# Patient Record
Sex: FEMALE | Race: OTHER | Employment: PART TIME | URBAN - NONMETROPOLITAN AREA
[De-identification: names, ages, dates, MRNs, and addresses within clinical notes are randomized per-mention and may not be internally consistent; named-entity substitution may affect disease eponyms.]

---

## 2020-01-09 ENCOUNTER — OFFICE VISIT (OUTPATIENT)
Dept: FAMILY MEDICINE CLINIC | Age: 50
End: 2020-01-09
Payer: MEDICAID

## 2020-01-09 VITALS
DIASTOLIC BLOOD PRESSURE: 68 MMHG | OXYGEN SATURATION: 98 % | HEIGHT: 63 IN | WEIGHT: 205 LBS | HEART RATE: 89 BPM | SYSTOLIC BLOOD PRESSURE: 112 MMHG | BODY MASS INDEX: 36.32 KG/M2

## 2020-01-09 PROCEDURE — 99204 OFFICE O/P NEW MOD 45 MIN: CPT | Performed by: FAMILY MEDICINE

## 2020-01-09 PROCEDURE — 93005 ELECTROCARDIOGRAM TRACING: CPT | Performed by: FAMILY MEDICINE

## 2020-01-09 PROCEDURE — G8482 FLU IMMUNIZE ORDER/ADMIN: HCPCS | Performed by: FAMILY MEDICINE

## 2020-01-09 PROCEDURE — 1036F TOBACCO NON-USER: CPT | Performed by: FAMILY MEDICINE

## 2020-01-09 PROCEDURE — 99211 OFF/OP EST MAY X REQ PHY/QHP: CPT | Performed by: FAMILY MEDICINE

## 2020-01-09 PROCEDURE — G8427 DOCREV CUR MEDS BY ELIG CLIN: HCPCS | Performed by: FAMILY MEDICINE

## 2020-01-09 PROCEDURE — 93010 ELECTROCARDIOGRAM REPORT: CPT | Performed by: INTERNAL MEDICINE

## 2020-01-09 PROCEDURE — 90686 IIV4 VACC NO PRSV 0.5 ML IM: CPT | Performed by: FAMILY MEDICINE

## 2020-01-09 PROCEDURE — G8417 CALC BMI ABV UP PARAM F/U: HCPCS | Performed by: FAMILY MEDICINE

## 2020-01-09 RX ORDER — CHLORAL HYDRATE 500 MG
CAPSULE ORAL
COMMUNITY
End: 2022-08-16

## 2020-01-09 SDOH — ECONOMIC STABILITY: FOOD INSECURITY: WITHIN THE PAST 12 MONTHS, THE FOOD YOU BOUGHT JUST DIDN'T LAST AND YOU DIDN'T HAVE MONEY TO GET MORE.: SOMETIMES TRUE

## 2020-01-09 SDOH — ECONOMIC STABILITY: TRANSPORTATION INSECURITY
IN THE PAST 12 MONTHS, HAS THE LACK OF TRANSPORTATION KEPT YOU FROM MEDICAL APPOINTMENTS OR FROM GETTING MEDICATIONS?: NO

## 2020-01-09 SDOH — ECONOMIC STABILITY: INCOME INSECURITY: HOW HARD IS IT FOR YOU TO PAY FOR THE VERY BASICS LIKE FOOD, HOUSING, MEDICAL CARE, AND HEATING?: SOMEWHAT HARD

## 2020-01-09 SDOH — ECONOMIC STABILITY: TRANSPORTATION INSECURITY
IN THE PAST 12 MONTHS, HAS LACK OF TRANSPORTATION KEPT YOU FROM MEETINGS, WORK, OR FROM GETTING THINGS NEEDED FOR DAILY LIVING?: NO

## 2020-01-09 SDOH — ECONOMIC STABILITY: FOOD INSECURITY: WITHIN THE PAST 12 MONTHS, YOU WORRIED THAT YOUR FOOD WOULD RUN OUT BEFORE YOU GOT MONEY TO BUY MORE.: SOMETIMES TRUE

## 2020-01-09 SDOH — HEALTH STABILITY: MENTAL HEALTH: HOW OFTEN DO YOU HAVE A DRINK CONTAINING ALCOHOL?: NOT ASKED

## 2020-01-09 ASSESSMENT — PATIENT HEALTH QUESTIONNAIRE - PHQ9
SUM OF ALL RESPONSES TO PHQ QUESTIONS 1-9: 0
1. LITTLE INTEREST OR PLEASURE IN DOING THINGS: 0
2. FEELING DOWN, DEPRESSED OR HOPELESS: 0
SUM OF ALL RESPONSES TO PHQ QUESTIONS 1-9: 0
SUM OF ALL RESPONSES TO PHQ9 QUESTIONS 1 & 2: 0

## 2020-01-09 ASSESSMENT — ENCOUNTER SYMPTOMS
EYES NEGATIVE: 1
RESPIRATORY NEGATIVE: 1

## 2020-01-09 NOTE — PATIENT INSTRUCTIONS
chícharos o arvejas (nandini lentejas). ? 2 tazas de fruta fresca, congelada o enlatada. Dorinda Renea pequeña o 1 banana (plátano) o naranja equivalen a 1 taza. ? 3 tazas de [employer] o semidescremada, yogur u otros productos lácteos sin grasa o con poca grasa. ? 5½ onzas (156 gramos) de carne y frijoles, nandini kody, pescado, carne magra, frijoles, nueces y semillas. Un huevo, 1 cucharada de mantequilla de cacahuate (maní), ½ onza de nueces o semillas, o ¼ taza de frijoles cocidos es equivalente a 1 onza de carne. · Aprenda a leer las etiquetas de los alimentos para conocer el tamaño de las porciones y los ingredientes. Las comidas precocidas y las comidas rápidas suelen contener poco o nada de frutas o verduras. Asegúrese de comer cierta cantidad de frutas y verduras para que al comidas samreen más nutritivas. · Revise castaneda diario de comidas. Sume lo que miner comido de cada jazmin alimentario y luego divida el total por la cantidad de nayeli. De esta Mandy, tendrá [facility] idea de cuánto está comiendo de cada jazmin. Amanda si puede encontrar la forma de cambiar castaneda dieta para que sea más saludable. Comience poco a poco  · No intente hacer cambios radicales en castaneda dieta de manera repentina. Podría sentir que está perdiendo al alimentos favoritos y [employer] más propenso a fallar. · Empiece lentamente y cambie al hábitos gradualmente. Pruebe algo de lo siguiente:  ? Consuma pan integral en lugar de pan agudelo.  ? Use [employer] o semidescremada en lugar de NALLELY Leiva. ? Coma arroz integral en lugar de arroz agudelo, y pasta de lam integral en lugar de pasta de harina dwaine. ? Pruebe yogur y quesos con Jamir Dikes. ? Aumente las frutas y las verduras en las comidas y cómalas nandini refrigerio. ? Agregue vitor, tomate, pepino y cebolla a al sándwiches. ? Kamille Bear al yogur y a los cereales. Disfrute de la comida  · Usted aún puede comer al alimentos preferidos.  Saddie Hurry solo necesite comerlos en scotty cantidad. Si al alimentos preferidos son ricos en grasa, sal y azúcar, reduzca la frecuencia con que los coma, lobo no los descarte del todo. · Consuma alimentos muy variados. Elija alimentos saludables cuando coma fuera de castaneda casa  · El tipo de restaurante que elige puede ayudarle a optar por alimentos saludables. Hoy en día, incluso las alpa de comida rápida ofrecen más opciones de alimentos saludables o con bajo contenido de [place]. · Elija porciones pequeñas o llévese a casa la mitad de castaneda comida. · Cuando coma fuera, pruebe:  ? Pizza vegetariana con pan de lam integral o kody a la boy (en lugar de salchicha o pepperoni). ? Pasta con verduras asadas, kody a las brasas o [address], en lugar de salsa de [facility]. ? Matias tortilla (\"wrap\") rellena de verduras o de kody a las brasas. ? Comidas cocidas a la boy o hervidas, en lugar de alimentos fritos o empanizados. Henry que las opciones saludables le resulten fáciles  · Compre verduras y frutas frescas envasadas, lavadas y listas para comer, nandini zanahorias tone, mezclas de Ran, y brócoli o coliflor cortados o desmenuzados. · Compre frutas envasadas y precortadas, nandini melón o olivia (ananá). · Seleccione jugos de 100% de fruta o verduras en vez de sodas. Limite el jugo a 4 a 6 onzas (½ a ¾ taza) al día. · Mezcle yogur con bajo contenido de [place], jugo de frutas, y fruta enlatada o congelada para preparar un licuado para el desayuno o el refrigerio. ¿Dónde puede encontrar más información en inglés? Marco Antonio Denier a [email or web address] e ingrese a castaneda cuenta de MyChart. Nathaly Pond I891 en el cuadro \"Search Health Information\" para más información (en inglés) sobre \"Cómo comer alimentos saludables: Instrucciones de cuidado - [ Eating Healthy Foods: Care Instructions ]. \"     Si no tiene matias cuenta, henry evangelina en el enlace \"Sign Up Now\".   Revisado: 21 agosto, 2019  Versión del contenido: 12.3  © 3920-3650 Healthwise, Incorporated. Las instrucciones de cuidado fueron adaptadas bajo licencia por [facility] ([facility]). Si usted tiene [place] afección médica o sobre estas instrucciones, siempre pregunte a castaneda profesional de flynn. [employer] niega toda garantía o responsabilidad por castaneda uso de esta información. Patient Education        Aprenda sobre la [place] - [ Nae Cook About Physical Activity ]  ¿Qué es la [facility]? La actividad física es cualquier tipo de actividad que le henry  el cuerpo. Los tipos de actividad física que pueden ayudarle a ponerse en forma y mantenerse saludable incluyen:  · Actividades aeróbicas o \"cardio\" que leslee que el corazón le kenna más rápido y le leslee respirar más leonie, nandini caminar a paso ligero, montar en bicicleta o correr. Las actividades aeróbicas fortalecen el corazón y los pulmones, y aumentan la [place]. · Actividades de entrenamiento de resistencia que leslee que al músculos trabajen contra algo, o lo \"resistan\", nandini levantar pesas o hacer flexiones. Estas actividades ayudan a tonificar y [employer]. · Estiramientos que le permitan  las articulaciones y los músculos en toda castaneda amplitud de [place]. El estiramiento le ayuda a ser más flexible y a evitar lesiones. ¿Cuáles son los beneficios de la [place]? Estar activo es matias de las mejores cosas que puede hacer para ponerse en forma y [place] saludable. Meryle Howard a:  · Sentirse más leonie y tener más energía para hacer todas las cosas que le guste hacer. · Concentrarse mejor en la escuela o el trabajo, y desempeñarse mejor en los deportes. · Sentirse, pensar y dormir mejor. · Isidor Valencia y mantener un peso saludable. · Perder grasa y desarrollar músculo magro. · Reducir el riesgo de problemas serios de [place]. · Mantener tramaine los [place], los músculos y las articulaciones. Estar en forma le permite hacer más [place].  Y le permite ejercitarse con más intensidad sin tanto esfuerzo. ¿Cómo puede hacer que la actividad física sea parte de castaneda shauna? Henry al menos 30 minutos de ejercicio la mayoría de los días de la [place]. Caminar es matias buena opción. Es posible que también quiera hacer otras actividades, nandini correr, nadar, montar en bicicleta o practicar tenis o deportes de equipo. Elija actividades que le gusten -las que le leslee latir el corazón más rápido, le fortalezcan los músculos y le leslee los músculos y las articulaciones más flexibles. Si encuentra más de matias cosa que le guste hacer, hágalas todas. No tiene que hacer lo mismo todos los días. Henry que castaneda corazón bombee más leonie todos los días. Cuenta toda [employer] henry latir el corazón y lo mantenga a rk frecuencia por un tiempo. Aquí hay algunas formas estupendas para hacer que el corazón le kenna más rápido:  · Ir a caminar a paso ligero, correr o montar en bicicleta. · Ir a caminar por senderos o a nadar. · Ir a patinar. · Jugar un partido de fútbol americano sin placaje, básquetbol o fútbol. · Montar en bicicleta. · Jugar tenis o racquetball. · [address] escaleras. Incluso algunas de las tareas del hogar pueden ser aeróbicas, simplemente hágalas a un ritmo más rápido. Pasar la aspiradora, barrer o cortar el césped, barrer el garaje, y [place] y encerar el coche, todos pueden ayudar a aumentar castaneda [place]. Fortalezca los músculos tyrone la semana. No tiene que levantar pesas pesados ni desarrollar músculos grandes y voluminosos para ser [place]. Hacer algunas actividades simples que leslee que los músculos trabajen contra [employer], o lo \"resistan\", puede ayudarle a fortalecerse. Por ejemplo, puede:  · Hacer flexiones de brazos (lagartijas) o abdominales, que utilizan castaneda propio peso corporal nandini resistencia. · Levantar pesas o mancuernas, o usar bandas elásticas, en casa o en un gimnasio o centro comunitario. Estire los músculos con frecuencia.  El del abdomen o en cristino o ambos hombros o brazos. ? Aturdimiento o debilidad repentina. ? Latidos del corazón rápidos o irregulares. Después de llamar al  911, es posible que el operador le diga que mastique 1 aspirina para adultos o de 2 a 4 aspirinas de dosis baja. Espere a matias ambulancia. No intente conducir usted mismo.    Llame hoy a castaneda médico si:    · Tiene cualquier dificultad para respirar.     · El dolor en el pecho empeora.     · Siente mareos o aturdimiento, o que está a punto de desmayarse.     · No mejora nandini se esperaba.     · Tiene dolor de pecho nuevo o diferente. ¿Dónde puede encontrar más información en inglés? Minor Hemant a [email or web address] e ingrese a castaneda cuenta de MyChart. Rika Vidal A120 en el cuadro \"Search Health Information\" para más información (en inglés) sobre \"Dolor de pecho: Instrucciones de cuidado - [ Chest Pain: Care Instructions ]. \"     Si no tiene matias cuenta, henry evangelina en el enlace \"Sign Up Now\". Revisado: 26 junio, 2019  Versión del contenido: 12.3  © 2297-7062 [employer]. Las instrucciones de cuidado fueron adaptadas bajo licencia por [facility] ([facility]). Si usted tiene [place] afección médica o sobre estas instrucciones, siempre pregunte a castaneda profesional de flynn. [employer] niega toda garantía o responsabilidad por castaneda uso de esta información.

## 2020-01-09 NOTE — PROGRESS NOTES
General: Skin is warm and dry. Neurological:      General: No focal deficit present. Mental Status: She is alert. Psychiatric:         Mood and Affect: Mood normal.     ekg sinus     Assessment:      Chest pain atypical  Headaches episodic  Episodic abdominal pain  Obese       Plan:      Labs per orders. Reviewed healthy diet and fitness  Flu shot today  There is a family history of heart disease  At this time will work on life style check labs and have her return in a week to review reports.   Of note she has had a mammogram around 5 months ago  Will consider other evaluations at next visit         Giancarlo Andrew MD

## 2020-01-14 ENCOUNTER — HOSPITAL ENCOUNTER (OUTPATIENT)
Dept: LAB | Age: 50
Setting detail: SPECIMEN
Discharge: HOME | End: 2020-01-14
Payer: MEDICAID

## 2020-01-14 LAB
ABSOLUTE EOS #: 0.21 K/UL (ref 0–0.44)
ABSOLUTE IMMATURE GRANULOCYTE: 0.04 K/UL (ref 0–0.3)
ABSOLUTE LYMPH #: 1.85 K/UL (ref 1.1–3.7)
ABSOLUTE MONO #: 0.53 K/UL (ref 0.1–1.2)
ALBUMIN SERPL-MCNC: 4.3 G/DL (ref 3.5–5.2)
ALBUMIN/GLOBULIN RATIO: 1.5 (ref 1–2.5)
ALP BLD-CCNC: 87 U/L (ref 35–104)
ALT SERPL-CCNC: 11 U/L (ref 5–33)
ANION GAP SERPL CALCULATED.3IONS-SCNC: 13 MMOL/L (ref 9–17)
AST SERPL-CCNC: 15 U/L
BASOPHILS # BLD: 1 % (ref 0–2)
BASOPHILS ABSOLUTE: 0.06 K/UL (ref 0–0.2)
BILIRUB SERPL-MCNC: 0.26 MG/DL (ref 0.3–1.2)
BUN BLDV-MCNC: 17 MG/DL (ref 6–20)
BUN/CREAT BLD: 27 (ref 9–20)
CALCIUM SERPL-MCNC: 9.4 MG/DL (ref 8.6–10.4)
CHLORIDE BLD-SCNC: 100 MMOL/L (ref 98–107)
CHOLESTEROL/HDL RATIO: 2.5
CHOLESTEROL: 127 MG/DL
CO2: 25 MMOL/L (ref 20–31)
CREAT SERPL-MCNC: 0.63 MG/DL (ref 0.5–0.9)
DIFFERENTIAL TYPE: ABNORMAL
EOSINOPHILS RELATIVE PERCENT: 3 % (ref 1–4)
GFR AFRICAN AMERICAN: >60 ML/MIN
GFR NON-AFRICAN AMERICAN: >60 ML/MIN
GFR SERPL CREATININE-BSD FRML MDRD: ABNORMAL ML/MIN/{1.73_M2}
GFR SERPL CREATININE-BSD FRML MDRD: ABNORMAL ML/MIN/{1.73_M2}
GLUCOSE BLD-MCNC: 92 MG/DL (ref 70–99)
HCT VFR BLD CALC: 36.5 % (ref 36.3–47.1)
HDLC SERPL-MCNC: 50 MG/DL
HEMOGLOBIN: 11.1 G/DL (ref 11.9–15.1)
IMMATURE GRANULOCYTES: 1 %
LDL CHOLESTEROL: 57 MG/DL (ref 0–130)
LYMPHOCYTES # BLD: 26 % (ref 24–43)
MCH RBC QN AUTO: 24.6 PG (ref 25.2–33.5)
MCHC RBC AUTO-ENTMCNC: 30.4 G/DL (ref 25.2–33.5)
MCV RBC AUTO: 80.9 FL (ref 82.6–102.9)
MONOCYTES # BLD: 7 % (ref 3–12)
NRBC AUTOMATED: 0 PER 100 WBC
PDW BLD-RTO: 16.6 % (ref 11.8–14.4)
PLATELET # BLD: 347 K/UL (ref 138–453)
PLATELET ESTIMATE: ABNORMAL
PMV BLD AUTO: 10.9 FL (ref 8.1–13.5)
POTASSIUM SERPL-SCNC: 4.3 MMOL/L (ref 3.7–5.3)
RBC # BLD: 4.51 M/UL (ref 3.95–5.11)
RBC # BLD: ABNORMAL 10*6/UL
SEG NEUTROPHILS: 63 % (ref 36–65)
SEGMENTED NEUTROPHILS ABSOLUTE COUNT: 4.47 K/UL (ref 1.5–8.1)
SODIUM BLD-SCNC: 138 MMOL/L (ref 135–144)
TOTAL PROTEIN: 7.1 G/DL (ref 6.4–8.3)
TRIGL SERPL-MCNC: 101 MG/DL
VLDLC SERPL CALC-MCNC: NORMAL MG/DL (ref 1–30)
WBC # BLD: 7.2 K/UL (ref 3.5–11.3)
WBC # BLD: ABNORMAL 10*3/UL

## 2020-01-14 PROCEDURE — 80053 COMPREHEN METABOLIC PANEL: CPT

## 2020-01-14 PROCEDURE — 85025 COMPLETE CBC W/AUTO DIFF WBC: CPT

## 2020-01-14 PROCEDURE — 36415 COLL VENOUS BLD VENIPUNCTURE: CPT

## 2020-01-14 PROCEDURE — 80061 LIPID PANEL: CPT

## 2020-01-16 ENCOUNTER — OFFICE VISIT (OUTPATIENT)
Dept: FAMILY MEDICINE CLINIC | Age: 50
End: 2020-01-16
Payer: MEDICAID

## 2020-01-16 VITALS
BODY MASS INDEX: 36.54 KG/M2 | RESPIRATION RATE: 16 BRPM | HEART RATE: 76 BPM | DIASTOLIC BLOOD PRESSURE: 84 MMHG | SYSTOLIC BLOOD PRESSURE: 128 MMHG | OXYGEN SATURATION: 97 % | WEIGHT: 203 LBS

## 2020-01-16 PROCEDURE — 99211 OFF/OP EST MAY X REQ PHY/QHP: CPT

## 2020-01-16 PROCEDURE — 99213 OFFICE O/P EST LOW 20 MIN: CPT | Performed by: FAMILY MEDICINE

## 2020-01-16 PROCEDURE — G8482 FLU IMMUNIZE ORDER/ADMIN: HCPCS | Performed by: FAMILY MEDICINE

## 2020-01-16 PROCEDURE — G8427 DOCREV CUR MEDS BY ELIG CLIN: HCPCS | Performed by: FAMILY MEDICINE

## 2020-01-16 PROCEDURE — 1036F TOBACCO NON-USER: CPT | Performed by: FAMILY MEDICINE

## 2020-01-16 PROCEDURE — G8417 CALC BMI ABV UP PARAM F/U: HCPCS | Performed by: FAMILY MEDICINE

## 2020-01-16 RX ORDER — BUTALBITAL, ACETAMINOPHEN AND CAFFEINE 300; 40; 50 MG/1; MG/1; MG/1
1 CAPSULE ORAL EVERY 4 HOURS PRN
COMMUNITY
End: 2021-07-08 | Stop reason: SDUPTHER

## 2020-01-16 RX ORDER — CYCLOBENZAPRINE HCL 10 MG
10 TABLET ORAL 3 TIMES DAILY PRN
COMMUNITY
End: 2022-08-16

## 2020-01-16 RX ORDER — ORPHENADRINE CITRATE 100 MG/1
100 TABLET, EXTENDED RELEASE ORAL 2 TIMES DAILY
COMMUNITY
End: 2022-08-16

## 2020-01-16 ASSESSMENT — ENCOUNTER SYMPTOMS
RESPIRATORY NEGATIVE: 1
GASTROINTESTINAL NEGATIVE: 1
BACK PAIN: 1

## 2020-01-16 NOTE — PATIENT INSTRUCTIONS
el suelo. Sosténgalo por lo menos de 15 a 30 segundos. 4. Relájese y regrese la rodilla a la posición inicial.  5. Repita el ejercicio con la otra pierna. Repita de 2 a 4 veces con cada pierna. 6. Para lograr mayor estiramiento, deje la otra pierna apoyada en el suelo mientras se michelle la rodilla [place]. Abdominales   1. Acuéstese en el suelo boca arriba, con las rodillas dobladas en un ángulo de 90 grados. Los pies deben estar apoyados en el suelo, a unas 12 pulgadas (30 cm) de las nalgas (glúteos). 2. Cruce los [place]. Si esto le causa molestias en el taylor, pruebe a poner las jacques detrás del taylor (no de la alfredo), con los codos abiertos. 3. Contraiga lentamente los músculos del abdomen y eleve los omóplatos del suelo. 4. Mantenga la alfredo alineada con el cuerpo y no presione la barbilla hacia el pecho. 5. Sostenga esta posición por 1 o 2 segundos y después baje lentamente de nuevo hacia el suelo. 6. Repita de 8 a 12 veces. Ejercicio de inclinación de pelvis   1. Acuéstese de espalda con las rodillas flexionadas. 2. \"Tense\" castaneda estómago. [place] significa apretar los músculos contrayendo el ombligo e imaginando que se mueve hacia la columna vertebral. Deberá sentir nandini si la espalda estuviera ejerciendo presión sobre el suelo y que las caderas y la pelvis se balancean hacia atrás. 3. Mantenga la posición tyrone aproximadamente 6 segundos mientras respira suavemente. 4. Repita de 8 a 12 veces. Becker con el talón   1. Acuéstese boca arriba con ambas rodillas flexionadas y los tobillos doblados de manera que solo los talones estén sobre el piso. Las rodillas deben estar dobladas más o menos a 90 grados. 2. A continuación henry presión con los talones en el suelo, apriete las nalgas (glúteos) y levante las caderas del suelo hasta que los hombros, las caderas y las rodillas estén en línea recta.   3. Mantenga la posición tyrone unos 6 segundos mientras sigue respirando

## 2020-02-21 ENCOUNTER — HOSPITAL ENCOUNTER (OUTPATIENT)
Dept: LAB | Age: 50
Setting detail: SPECIMEN
Discharge: HOME | End: 2020-02-21
Payer: MEDICAID

## 2020-02-21 LAB
ABSOLUTE EOS #: 0.21 K/UL (ref 0–0.44)
ABSOLUTE IMMATURE GRANULOCYTE: <0.03 K/UL (ref 0–0.3)
ABSOLUTE LYMPH #: 2.17 K/UL (ref 1.1–3.7)
ABSOLUTE MONO #: 0.55 K/UL (ref 0.1–1.2)
BASOPHILS # BLD: 1 % (ref 0–2)
BASOPHILS ABSOLUTE: 0.05 K/UL (ref 0–0.2)
DATE, STOOL #1: NORMAL
DATE, STOOL #2: NORMAL
DATE, STOOL #3: NORMAL
DIFFERENTIAL TYPE: ABNORMAL
EOSINOPHILS RELATIVE PERCENT: 3 % (ref 1–4)
HCT VFR BLD CALC: 36.1 % (ref 36.3–47.1)
HEMOCCULT SP1 STL QL: NEGATIVE
HEMOCCULT SP2 STL QL: NORMAL
HEMOCCULT SP3 STL QL: NORMAL
HEMOGLOBIN: 10.9 G/DL (ref 11.9–15.1)
IMMATURE GRANULOCYTES: 0 %
LYMPHOCYTES # BLD: 31 % (ref 24–43)
MCH RBC QN AUTO: 24.2 PG (ref 25.2–33.5)
MCHC RBC AUTO-ENTMCNC: 30.2 G/DL (ref 25.2–33.5)
MCV RBC AUTO: 80.2 FL (ref 82.6–102.9)
MONOCYTES # BLD: 8 % (ref 3–12)
NRBC AUTOMATED: 0 PER 100 WBC
PDW BLD-RTO: 16.2 % (ref 11.8–14.4)
PLATELET # BLD: 404 K/UL (ref 138–453)
PLATELET ESTIMATE: ABNORMAL
PMV BLD AUTO: 10.8 FL (ref 8.1–13.5)
RBC # BLD: 4.5 M/UL (ref 3.95–5.11)
RBC # BLD: ABNORMAL 10*6/UL
SEG NEUTROPHILS: 57 % (ref 36–65)
SEGMENTED NEUTROPHILS ABSOLUTE COUNT: 3.91 K/UL (ref 1.5–8.1)
THYROXINE, FREE: 1.01 NG/DL (ref 0.93–1.7)
TIME, STOOL #1: 745
TIME, STOOL #2: NORMAL
TIME, STOOL #3: NORMAL
TSH SERPL DL<=0.05 MIU/L-ACNC: 3.87 MIU/L (ref 0.3–5)
WBC # BLD: 6.9 K/UL (ref 3.5–11.3)
WBC # BLD: ABNORMAL 10*3/UL

## 2020-02-21 PROCEDURE — 82274 ASSAY TEST FOR BLOOD FECAL: CPT

## 2020-02-21 PROCEDURE — 36415 COLL VENOUS BLD VENIPUNCTURE: CPT

## 2020-02-21 PROCEDURE — 84439 ASSAY OF FREE THYROXINE: CPT

## 2020-02-21 PROCEDURE — 85025 COMPLETE CBC W/AUTO DIFF WBC: CPT

## 2020-02-21 PROCEDURE — 84443 ASSAY THYROID STIM HORMONE: CPT

## 2020-02-24 ENCOUNTER — OFFICE VISIT (OUTPATIENT)
Dept: OPTOMETRY | Age: 50
End: 2020-02-24
Payer: MEDICAID

## 2020-02-24 PROCEDURE — 92004 COMPRE OPH EXAM NEW PT 1/>: CPT | Performed by: OPTOMETRIST

## 2020-02-24 ASSESSMENT — VISUAL ACUITY
OS_SC+: -1
METHOD: SNELLEN - LINEAR
OS_SC: 20/25
OD_SC+: -2
OD_SC: 20/20

## 2020-02-24 ASSESSMENT — REFRACTION_MANIFEST
OD_SPHERE: -0.25
OD_CYLINDER: DS
OS_ADD: +1.75
OD_ADD: +1.75
OS_CYLINDER: -0.25
OS_SPHERE: PLANO
OS_AXIS: 075

## 2020-02-24 ASSESSMENT — TONOMETRY
IOP_METHOD: NON-CONTACT AIR PUFF
OD_IOP_MMHG: 24
OS_IOP_MMHG: 21

## 2020-02-24 ASSESSMENT — SLIT LAMP EXAM - LIDS
COMMENTS: NORMAL
COMMENTS: NORMAL

## 2020-02-24 ASSESSMENT — REFRACTION_WEARINGRX: OD_SPHERE: NOT WITH

## 2020-02-24 NOTE — PROGRESS NOTES
Vita Elkins presents today for   Chief Complaint   Patient presents with    Blurred Vision    Vision Exam   .    HPI     Blurred Vision     In both eyes. Vision is blurred. Context:  distance vision. Comments     Last Vision Exam: Years ago in [address] [place] [address] Ophthalmology Exam: n/a  Last Filled Glasses Rx: ? Insurance: march  Update: Glasses  Distance and reading are getting more blurry                 Current Outpatient Medications   Medication Sig Dispense Refill    butalbital-APAP-caffeine (FIORICET) -40 MG CAPS per capsule Take 1 capsule by mouth every 4 hours as needed for Headaches      orphenadrine (NORFLEX) 100 MG extended release tablet Take 100 mg by mouth 2 times daily      cyclobenzaprine (FLEXERIL) 10 MG tablet Take 10 mg by mouth 3 times daily as needed for Muscle spasms      Omega-3 1000 MG CAPS Take by mouth      CINNAMON PO Take by mouth       No current facility-administered medications for this visit.           Family History   Problem Relation Age of Onset    Cataracts Neg Hx     Diabetes Neg Hx     Glaucoma Neg Hx      Social History     Socioeconomic History    Marital status: Unknown     Spouse name: None    Number of children: None    Years of education: None    Highest education level: None   Occupational History    None   Social Needs    Financial resource strain: Somewhat hard    Food insecurity:     Worry: Sometimes true     Inability: Sometimes true    Transportation needs:     Medical: No     Non-medical: No   Tobacco Use    Smoking status: Never Smoker    Smokeless tobacco: Never Used   Substance and Sexual Activity    Alcohol use: Never    Drug use: Never    Sexual activity: None   Lifestyle    Physical activity:     Days per week: None     Minutes per session: None    Stress: None   Relationships    Social connections:     Talks on phone: None     Gets together: None     Attends [employer] service: None     Active member of Right +1.50 ds      Left +1.75 -0.25 075     Type:  RRX     Expiration Date:  2/24/2022            1. Presbyopia of both eyes           Patient Instructions   New glasses recommended ;  Can fill reading only or get a bifocal      Return in about 2 years (around 2/24/2022) for complete eye exam.

## 2021-07-01 ENCOUNTER — HOSPITAL ENCOUNTER (EMERGENCY)
Age: 51
Discharge: HOME | End: 2021-07-01
Attending: EMERGENCY MEDICINE
Payer: MEDICAID

## 2021-07-01 VITALS
OXYGEN SATURATION: 98 % | DIASTOLIC BLOOD PRESSURE: 78 MMHG | HEART RATE: 71 BPM | TEMPERATURE: 98.2 F | RESPIRATION RATE: 16 BRPM | SYSTOLIC BLOOD PRESSURE: 122 MMHG

## 2021-07-01 DIAGNOSIS — J01.40 ACUTE NON-RECURRENT PANSINUSITIS: Primary | ICD-10-CM

## 2021-07-01 PROCEDURE — 99285 EMERGENCY DEPT VISIT HI MDM: CPT

## 2021-07-01 PROCEDURE — 6370000000 HC RX 637 (ALT 250 FOR IP): Performed by: EMERGENCY MEDICINE

## 2021-07-01 RX ORDER — AMOXICILLIN AND CLAVULANATE POTASSIUM 875; 125 MG/1; MG/1
1 TABLET, FILM COATED ORAL 2 TIMES DAILY
Qty: 14 TABLET | Refills: 0 | Status: SHIPPED | OUTPATIENT
Start: 2021-07-01 | End: 2021-07-08

## 2021-07-01 RX ORDER — AMOXICILLIN AND CLAVULANATE POTASSIUM 875; 125 MG/1; MG/1
1 TABLET, FILM COATED ORAL ONCE
Status: COMPLETED | OUTPATIENT
Start: 2021-07-01 | End: 2021-07-01

## 2021-07-01 RX ADMIN — AMOXICILLIN AND CLAVULANATE POTASSIUM 1 TABLET: 875; 125 TABLET, FILM COATED ORAL at 21:35

## 2021-07-01 ASSESSMENT — PAIN DESCRIPTION - ORIENTATION
ORIENTATION: LEFT;RIGHT;UPPER
ORIENTATION: UPPER

## 2021-07-01 ASSESSMENT — PAIN DESCRIPTION - DESCRIPTORS
DESCRIPTORS: PRESSURE;THROBBING
DESCRIPTORS: ACHING

## 2021-07-01 ASSESSMENT — PAIN DESCRIPTION - PAIN TYPE
TYPE: ACUTE PAIN
TYPE: ACUTE PAIN

## 2021-07-01 ASSESSMENT — PAIN DESCRIPTION - LOCATION
LOCATION: FACE
LOCATION: FACE

## 2021-07-01 ASSESSMENT — PAIN DESCRIPTION - FREQUENCY: FREQUENCY: CONTINUOUS

## 2021-07-01 ASSESSMENT — PAIN DESCRIPTION - ONSET: ONSET: ON-GOING

## 2021-07-01 ASSESSMENT — PAIN DESCRIPTION - PROGRESSION: CLINICAL_PROGRESSION: NOT CHANGED

## 2021-07-01 ASSESSMENT — PAIN SCALES - GENERAL
PAINLEVEL_OUTOF10: 5
PAINLEVEL_OUTOF10: 5

## 2021-07-02 ASSESSMENT — ENCOUNTER SYMPTOMS
COUGH: 0
SINUS PRESSURE: 1
TROUBLE SWALLOWING: 0
SHORTNESS OF BREATH: 0
NAUSEA: 0
VOMITING: 0
SINUS PAIN: 1

## 2021-07-02 NOTE — ED PROVIDER NOTES
[facility] ED  [address]  [place] [address]  Phone: [phone]  eMERGENCY dEPARTMENT eNCOUnter      Pt Name: Jamir Pacheco  MRN: [id number]  [place] 1970  Date of evaluation: 7/2/21      CHIEF COMPLAINT     Chief Complaint   Patient presents with    Headache    Sinusitis     for 5 days         HISTORY OF PRESENT ILLNESS    Jamir Pacheco is a 52 y.o. female who presents today for evaluation of increasing sinus pain and headache. Patient also has yellow mucoid nasal discharge and feelings of postnasal drip. Has not noticed a fever. 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 She also has a second job at [employer] in the evenings. Patient was not able to go to her second job this afternoon. No visual changes no ear pain. No difficulty breathing or swallowing. No cough no chest pain or shortness of breath. Patient speaks Sammarinese, language line is used for translation. Has tried Mucinex, multiple over-the-counter antihistamines and DayQuil. REVIEW OF SYSTEMS     Review of Systems   Constitutional: Negative for fever. HENT: Positive for congestion, sinus pressure and sinus pain. Negative for trouble swallowing. Respiratory: Negative for cough and shortness of breath. Cardiovascular: Negative for chest pain. Gastrointestinal: Negative for nausea and vomiting. Skin: Negative for rash. Neurological: Positive for headaches. Psychiatric/Behavioral: Negative for confusion. All other systems reviewed and are negative. PAST MEDICAL HISTORY    has no past medical history on file.     SURGICAL HISTORY      has a past surgical history that includes Cervical polyp removal.    CURRENT MEDICATIONS       Discharge Medication List as of 7/1/2021  9:32 PM      CONTINUE these medications which have NOT CHANGED    Details   butalbital-APAP-caffeine (FIORICET) -40 MG CAPS per capsule Take 1 capsule by mouth every 4 hours as needed for HeadachesHistorical Med      orphenadrine (NORFLEX) 100 MG extended release tablet Take 100 mg by mouth 2 times dailyHistorical Med      cyclobenzaprine (FLEXERIL) 10 MG tablet Take 10 mg by mouth 3 times daily as needed for Muscle spasmsHistorical Med      Omega-3 1000 MG CAPS Take by mouthHistorical Med      CINNAMON PO Take by mouthHistorical Med             ALLERGIES     has No Known Allergies. FAMILY HISTORY     She indicated that the status of her neg hx is unknown.     family history is not on file. SOCIAL HISTORY      reports that she has never smoked. She has never used smokeless tobacco. She reports that she does not drink alcohol and does not use drugs. PHYSICAL EXAM     INITIAL VITALS:  tympanic temperature is 98.2 °F (36.8 °C). Her blood pressure is 122/78 and her pulse is 71. Her respiration is 16 and oxygen saturation is 98%. Physical Exam  Vitals reviewed. Constitutional:       General: She is not in acute distress. Appearance: She is not ill-appearing. HENT:      Head: Normocephalic and atraumatic. Right Ear: Tympanic membrane normal.      Left Ear: Tympanic membrane normal.      Ears:      Comments: Patient with tenderness to percussion of the left frontal sinus and the bilateral maxillary sinuses. No facial swelling or erythema. Mouth/Throat:      Mouth: Mucous membranes are moist.   Eyes:      Extraocular Movements: Extraocular movements intact. Pupils: Pupils are equal, round, and reactive to light. Cardiovascular:      Rate and Rhythm: Normal rate and regular rhythm. Heart sounds: Normal heart sounds. Pulmonary:      Effort: Pulmonary effort is normal. No respiratory distress. Breath sounds: Normal breath sounds. Musculoskeletal:      Cervical back: Neck supple. No rigidity. Lymphadenopathy:      Cervical: Cervical adenopathy present. Skin:     General: Skin is warm and dry.       Capillary Refill: Capillary refill takes less than 2 seconds. Findings: No rash. Neurological:      General: No focal deficit present. Mental Status: She is alert and oriented to person, place, and time. Cranial Nerves: No cranial nerve deficit. Sensory: No sensory deficit. Motor: No weakness. Psychiatric:         Mood and Affect: Mood normal.         Behavior: Behavior normal.       DIFFERENTIAL DIAGNOSIS / MDM / EMERGENCY DEPARTMENT COURSE:     No indication for neuroimaging at this time. Will treat with Augmentin given duration of symptoms and no improvement with over-the-counter remedies. Also suggested possible Flonase or Farida pot. Patient educated on the warning signs which return to the emergency department. Given work for note. She had no further questions or concerns. I have reviewed the disposition diagnosis with the patient and or their family/guardian. I have answered their questions and givendischarge instructions. They voiced understanding of these instructions and did not have any further questions or complaints. DIAGNOSTIC RESULTS     EKG: All EKG's are interpreted by the Emergency Department Physician who either signs or Co-signs this chart inthe absence of a cardiologist.        RADIOLOGY:   I directly visualized the following plain film images and reviewed the radiologistinterpretations of radiologic studies:    No orders to display        No results found. LABS:  No results found for this visit on 07/01/21. EMERGENCY DEPARTMENT COURSE:   Vitals:    Vitals:    07/01/21 2030 07/01/21 2138   BP: 139/82 122/78   Pulse: 91 71   Resp: 16 16   Temp: 98.2 °F (36.8 °C)    TempSrc: Tympanic    SpO2: 98%      -------------------------  BP: 122/78, Temp: 98.2 °F (36.8 °C), Pulse: 71, Resp: 16      CONSULTS:  None    PROCEDURES:  None    FINAL IMPRESSION      1.  Acute non-recurrent pansinusitis          DISPOSITION/PLAN   DISPOSITION Decision To Discharge 07/01/2021 09:17:56 PM      PATIENT REFERRED TO:  Kimberly Rubalcava MD  [address] [place] [address]  [phone]    In 2 days      [facility] [address] [place] [address]  [phone]  In 2 days        DISCHARGE MEDICATIONS:  Discharge Medication List as of 7/1/2021  9:32 PM      START taking these medications    Details   amoxicillin-clavulanate (AUGMENTIN) 875-125 MG per tablet Take 1 tablet by mouth 2 times daily for 7 days, Disp-14 tablet, R-0Normal             (Please note that portions of this note were completed with avoice recognition program.  Efforts were made to edit the dictations but occasionally words are mis-transcribed.)    Isabela West MD, [facility]  Attending Emergency Medicine Physician        Isabela West MD  07/02/21 7357

## 2021-07-08 ENCOUNTER — OFFICE VISIT (OUTPATIENT)
Dept: FAMILY MEDICINE CLINIC | Age: 51
End: 2021-07-08
Payer: MEDICAID

## 2021-07-08 VITALS
HEART RATE: 68 BPM | HEIGHT: 63 IN | OXYGEN SATURATION: 97 % | SYSTOLIC BLOOD PRESSURE: 130 MMHG | BODY MASS INDEX: 36.5 KG/M2 | WEIGHT: 206 LBS | DIASTOLIC BLOOD PRESSURE: 80 MMHG

## 2021-07-08 DIAGNOSIS — Z11.59 NEED FOR HEPATITIS C SCREENING TEST: ICD-10-CM

## 2021-07-08 DIAGNOSIS — Z23 NEED FOR TDAP VACCINATION: ICD-10-CM

## 2021-07-08 DIAGNOSIS — J01.90 ACUTE NON-RECURRENT SINUSITIS, UNSPECIFIED LOCATION: Primary | ICD-10-CM

## 2021-07-08 DIAGNOSIS — Z11.4 SCREENING FOR HIV (HUMAN IMMUNODEFICIENCY VIRUS): ICD-10-CM

## 2021-07-08 DIAGNOSIS — Z13.220 SCREENING, LIPID: ICD-10-CM

## 2021-07-08 DIAGNOSIS — R51.9 NONINTRACTABLE EPISODIC HEADACHE, UNSPECIFIED HEADACHE TYPE: ICD-10-CM

## 2021-07-08 DIAGNOSIS — D64.9 ANEMIA, UNSPECIFIED TYPE: ICD-10-CM

## 2021-07-08 PROCEDURE — 1036F TOBACCO NON-USER: CPT | Performed by: NURSE PRACTITIONER

## 2021-07-08 PROCEDURE — G8417 CALC BMI ABV UP PARAM F/U: HCPCS | Performed by: NURSE PRACTITIONER

## 2021-07-08 PROCEDURE — 99214 OFFICE O/P EST MOD 30 MIN: CPT | Performed by: NURSE PRACTITIONER

## 2021-07-08 PROCEDURE — 90715 TDAP VACCINE 7 YRS/> IM: CPT | Performed by: NURSE PRACTITIONER

## 2021-07-08 PROCEDURE — G8427 DOCREV CUR MEDS BY ELIG CLIN: HCPCS | Performed by: NURSE PRACTITIONER

## 2021-07-08 RX ORDER — BUTALBITAL, ACETAMINOPHEN AND CAFFEINE 300; 40; 50 MG/1; MG/1; MG/1
1 CAPSULE ORAL EVERY 4 HOURS PRN
Qty: 30 CAPSULE | Refills: 0 | Status: SHIPPED | OUTPATIENT
Start: 2021-07-08 | End: 2022-08-16

## 2021-07-08 ASSESSMENT — PATIENT HEALTH QUESTIONNAIRE - PHQ9
SUM OF ALL RESPONSES TO PHQ QUESTIONS 1-9: 0
1. LITTLE INTEREST OR PLEASURE IN DOING THINGS: 0
SUM OF ALL RESPONSES TO PHQ QUESTIONS 1-9: 0
2. FEELING DOWN, DEPRESSED OR HOPELESS: 0
SUM OF ALL RESPONSES TO PHQ9 QUESTIONS 1 & 2: 0
SUM OF ALL RESPONSES TO PHQ QUESTIONS 1-9: 0

## 2021-07-08 ASSESSMENT — ENCOUNTER SYMPTOMS
COUGH: 0
SHORTNESS OF BREATH: 0

## 2021-07-08 NOTE — PATIENT INSTRUCTIONS
Patient Education        Anemia: Instrucciones de cuidado  Anemia: Care Instructions  Instrucciones de cuidado    La anemia es un bajo nivel de glóbulos rojos, que son los que transportan el oxígeno por el organismo. Muchas cosas pueden causar anemia. La falta de elmer es matias de las causas más comunes. Castaneda organismo necesita elmer para producir hemoglobina, matias sustancia de los glóbulos rojos que transporta el oxígeno de los pulmones a las células del organismo. Si no hay suficiente elmer, el organismo produce glóbulos rojos más pequeños y en scotty cantidad. Debido a ello, las células de castaneda organismo no obtienen suficiente oxígeno y usted se sentirá cansado y débil. Y puede tener dificultad para concentrarse. El sangrado es la causa más común de la falta de elmer. Podría tener sangrado menstrual abundante o hemorragias causadas por afecciones tales nandini úlceras, hemorroides o cáncer. El uso habitual de aspirina u otros medicamentos antiinflamatorios (nandini ibuprofeno) también puede causar sangrado en [employer]. La falta de elmer en castaneda dieta también puede causar anemia, sobre todo en los momentos en los que el organismo necesita más elmer, nandini tyrone el [facility], la infancia y la adolescencia. Es posible que castaneda médico le haya recetado pastillas de elmer. El tratamiento puede alexis algunos meses hasta que al niveles de elmer regresen a la normalidad. Castaneda médico también puede sugerirle que consuma alimentos ricos en elmer, nandini carne y frijoles (habichuelas). Existen muchas otras causas de la anemia. No siempre es causada por la falta de elmer. Descubrir la causa específica de castaneda anemia le ayudará a castaneda médico a encontrar el tratamiento adecuado para usted. La atención de seguimiento es matias parte clave de castaneda tratamiento y seguridad. Asegúrese de hacer y acudir a todas las citas, y llame a castaneda médico si está teniendo problemas.  También es matias buena idea saber los resultados de al exámenes y mantener matias lista de los medicamentos que марина. ¿Cómo puede cuidarse en el hogar? · [employer] medicamentos exactamente nandini le fueron recetados. Llame a castaneda médico si silas estar teniendo problemas con castaneda medicamento. · Si castaneda médico le recomienda pastillas de elmer, tómelas según las indicaciones:  ? Trate de alexis las pastillas con el estómago vacío 1 hora antes de comer o 2 horas después de comer. Jose podría necesitar alexis el elmer con la comida para evitar el malestar estomacal.  ? No tome antiácidos, leche o bebidas con cafeína (nandini café, té o bebidas de cola) al [employer] o 2 horas antes o después de liz tomado las pastillas de elmer. Estos pueden dificultar la absorción del elmer en el organismo. ? La vitamina C (de alimentos o suplementos) ayuda a castaneda organismo a absorber el elmer. Trate de alexis las pastillas de elmer con un vaso de jugo de naranja o con otro tipo de alimento rico en vitamina C, nandini las frutas cítricas. ? Las pastillas de elmer podrían causar [employer], nandini acidez [place], [place], diarrea, estreñimiento y retortijones. Asegúrese de beber abundantes líquidos e incluya en castaneda dieta diaria frutas, verduras y [place]. Las pastillas de elmer muchas veces hacen que al evacuaciones samreen oscuras o verdosas. ? Si olvida alexis castaneda pastilla de elmer, no tome matias dosis doble la próxima vez.  ? Mantenga las pastillas de elmer fuera del alcance de los niños pequeños. La sobredosis de elmer puede ser [employer]. · Siga los consejos de castaneda médico acerca del consumo de alimentos ricos en elmer. Entre estos se encuentran las alejandra pradhan, los [place], las aves, los [place], los frijoles, las uvas pasas, el pan integral y las verduras de hoja barbara. · Prepare las verduras al vapor para que puedan retener castaneda contenido de elmer. ¿Cuándo debe pedir ayuda? Llame al 911 en cualquier momento que considere que necesita atención de [place].  Por ejemplo, llame si:    pregunte a castaneda profesional de flynn. [facility] niega toda garantía o responsabilidad por castaneda uso de esta información.

## 2021-07-08 NOTE — PROGRESS NOTES
Cherelle Woods (:  1970) is a 52 y.o. female,Established patient, here for evaluation of the following chief complaint(s):  Established New Doctor (suffers from chronic HA. previous [place] pt.) and Sinus Problem (seen in [id number] [facility] ER on Thursday. neg covid.)         ASSESSMENT/PLAN:  1. Acute non-recurrent sinusitis, unspecified location  - Finish remainder of Augmentin  2. Need for Tdap vaccination  -     Tdap (age 6y and older) IM ([address])  3. Screening, lipid  -     Lipid Panel; Future  4. Need for hepatitis C screening test  -     Hepatitis C Antibody; Future  5. Screening for HIV (human immunodeficiency virus)  -     HIV Screen; Future  6. Anemia, unspecified type  -     Comprehensive Metabolic Panel; Future  -     CBC Auto Differential; Future  7. Nonintractable episodic headache, unspecified headache type  -     butalbital-APAP-caffeine (FIORICET) -40 MG CAPS per capsule; Take 1 capsule by mouth every 4 hours as needed for Headaches, Disp-30 capsule, R-0Normal  -     TSH without Reflex; Future  -     Vitamin D 25 Hydroxy; Future      Return in about 1 month (around 2021), or if symptoms worsen or fail to improve, for women's health exam.         Subjective   SUBJECTIVE/OBJECTIVE:  HPI  She is here today as a new patient to me. She had previously seen Dr Sue Stafford. She was in the ER recently for sinusitis. She states she is feeling better but realized she only took Augmentin daily instead of BID as she can not read the bottle correctly. She is primarily [place] speaking. 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 She does not currently feel she needs it. She also states she gets intermittent headaches for which she normally takes Tylenol but occasionally does need Fioricet. She previously had a history of anemia. She is not taking an iron supplement currently. She had tried it int he past and it made her constipated. Has a history of uterine polyps. She has not had any women's health care in a few years-last care was in [facility].    Review of Systems   Constitutional: Negative for fatigue and fever. HENT: Negative. Eyes: Negative for visual disturbance. Respiratory: Negative for cough and shortness of breath. Cardiovascular: Negative for chest pain. Genitourinary: Negative. Musculoskeletal: Negative. Skin: Negative for rash. Neurological: Positive for headaches. Psychiatric/Behavioral: Negative. Objective   Physical Exam  Vitals and nursing note reviewed. Constitutional:       General: She is not in acute distress. Appearance: Normal appearance. She is well-developed. HENT:      Head: Normocephalic. Right Ear: Tympanic membrane and external ear normal.      Left Ear: Tympanic membrane and external ear normal.      Nose: Nose normal.      Mouth/Throat:      Pharynx: Uvula midline. Eyes:      Conjunctiva/sclera: Conjunctivae normal.      Pupils: Pupils are equal, round, and reactive to light. Cardiovascular:      Rate and Rhythm: Normal rate and regular rhythm. Heart sounds: Normal heart sounds. No murmur heard. Pulmonary:      Effort: Pulmonary effort is normal.      Breath sounds: Normal breath sounds. No wheezing. Abdominal:      General: Bowel sounds are normal.      Palpations: Abdomen is soft. There is no mass. Tenderness: There is no abdominal tenderness. Musculoskeletal:         General: Normal range of motion. Cervical back: Normal range of motion and neck supple. Lymphadenopathy:      Cervical: No cervical adenopathy. Skin:     General: Skin is warm and dry. Findings: No rash. Neurological:      Mental Status: She is alert and oriented to person, place, and time. Psychiatric:         Mood and Affect: Mood normal.         Behavior: Behavior normal.         Thought Content:  Thought content normal.          Due to language barrier, an  was present during the history-taking and subsequent discussion (and for part of the physical exam) with this patient. On this date 7/8/2021 I have spent 45 minutes reviewing previous notes, test results and face to face with the patient discussing the diagnosis and importance of compliance with the treatment plan as well as documenting on the day of the visit. An electronic signature was used to authenticate this note.     --FABIAN Guallpa - CNP

## 2021-07-09 ENCOUNTER — HOSPITAL ENCOUNTER (OUTPATIENT)
Dept: LAB | Age: 51
Discharge: HOME | End: 2021-07-09
Payer: MEDICAID

## 2021-07-09 DIAGNOSIS — Z11.59 NEED FOR HEPATITIS C SCREENING TEST: ICD-10-CM

## 2021-07-09 DIAGNOSIS — Z13.220 SCREENING, LIPID: ICD-10-CM

## 2021-07-09 DIAGNOSIS — D64.9 ANEMIA, UNSPECIFIED TYPE: ICD-10-CM

## 2021-07-09 DIAGNOSIS — Z11.4 SCREENING FOR HIV (HUMAN IMMUNODEFICIENCY VIRUS): ICD-10-CM

## 2021-07-09 DIAGNOSIS — R51.9 NONINTRACTABLE EPISODIC HEADACHE, UNSPECIFIED HEADACHE TYPE: ICD-10-CM

## 2021-07-09 LAB
ABSOLUTE EOS #: 0.21 K/UL (ref 0–0.44)
ABSOLUTE IMMATURE GRANULOCYTE: 0.05 K/UL (ref 0–0.3)
ABSOLUTE LYMPH #: 1.95 K/UL (ref 1.1–3.7)
ABSOLUTE MONO #: 0.43 K/UL (ref 0.1–1.2)
ALBUMIN SERPL-MCNC: 3.8 G/DL (ref 3.5–5.2)
ALBUMIN/GLOBULIN RATIO: 1.1 (ref 1–2.5)
ALP BLD-CCNC: 94 U/L (ref 35–104)
ALT SERPL-CCNC: 20 U/L (ref 5–33)
ANION GAP SERPL CALCULATED.3IONS-SCNC: 8 MMOL/L (ref 9–17)
AST SERPL-CCNC: 15 U/L
BASOPHILS # BLD: 1 % (ref 0–2)
BASOPHILS ABSOLUTE: 0.06 K/UL (ref 0–0.2)
BILIRUB SERPL-MCNC: 0.13 MG/DL (ref 0.3–1.2)
BUN BLDV-MCNC: 15 MG/DL (ref 6–20)
BUN/CREAT BLD: 25 (ref 9–20)
CALCIUM SERPL-MCNC: 9.3 MG/DL (ref 8.6–10.4)
CHLORIDE BLD-SCNC: 102 MMOL/L (ref 98–107)
CHOLESTEROL/HDL RATIO: 3
CHOLESTEROL: 141 MG/DL
CO2: 28 MMOL/L (ref 20–31)
CREAT SERPL-MCNC: 0.6 MG/DL (ref 0.5–0.9)
DIFFERENTIAL TYPE: ABNORMAL
EOSINOPHILS RELATIVE PERCENT: 3 % (ref 1–4)
GFR AFRICAN AMERICAN: >60 ML/MIN
GFR NON-AFRICAN AMERICAN: >60 ML/MIN
GFR SERPL CREATININE-BSD FRML MDRD: ABNORMAL ML/MIN/{1.73_M2}
GFR SERPL CREATININE-BSD FRML MDRD: ABNORMAL ML/MIN/{1.73_M2}
GLUCOSE BLD-MCNC: 87 MG/DL (ref 70–99)
HCT VFR BLD CALC: 41.2 % (ref 36.3–47.1)
HDLC SERPL-MCNC: 47 MG/DL
HEMOGLOBIN: 12.6 G/DL (ref 11.9–15.1)
HEPATITIS C ANTIBODY: NONREACTIVE
HIV AG/AB: NONREACTIVE
IMMATURE GRANULOCYTES: 1 %
LDL CHOLESTEROL: 72 MG/DL (ref 0–130)
LYMPHOCYTES # BLD: 31 % (ref 24–43)
MCH RBC QN AUTO: 26.5 PG (ref 25.2–33.5)
MCHC RBC AUTO-ENTMCNC: 30.6 G/DL (ref 25.2–33.5)
MCV RBC AUTO: 86.7 FL (ref 82.6–102.9)
MONOCYTES # BLD: 7 % (ref 3–12)
NRBC AUTOMATED: 0 PER 100 WBC
PDW BLD-RTO: 15.4 % (ref 11.8–14.4)
PLATELET # BLD: 522 K/UL (ref 138–453)
PLATELET ESTIMATE: ABNORMAL
PMV BLD AUTO: 9.2 FL (ref 8.1–13.5)
POTASSIUM SERPL-SCNC: 4.2 MMOL/L (ref 3.7–5.3)
RBC # BLD: 4.75 M/UL (ref 3.95–5.11)
RBC # BLD: ABNORMAL 10*6/UL
SEG NEUTROPHILS: 57 % (ref 36–65)
SEGMENTED NEUTROPHILS ABSOLUTE COUNT: 3.51 K/UL (ref 1.5–8.1)
SODIUM BLD-SCNC: 138 MMOL/L (ref 135–144)
TOTAL PROTEIN: 7.4 G/DL (ref 6.4–8.3)
TRIGL SERPL-MCNC: 112 MG/DL
TSH SERPL DL<=0.05 MIU/L-ACNC: 2.41 MIU/L (ref 0.3–5)
VITAMIN D 25-HYDROXY: 28.8 NG/ML (ref 30–100)
VLDLC SERPL CALC-MCNC: NORMAL MG/DL (ref 1–30)
WBC # BLD: 6.2 K/UL (ref 3.5–11.3)
WBC # BLD: ABNORMAL 10*3/UL

## 2021-07-09 PROCEDURE — 85025 COMPLETE CBC W/AUTO DIFF WBC: CPT

## 2021-07-09 PROCEDURE — 87389 HIV-1 AG W/HIV-1&-2 AB AG IA: CPT

## 2021-07-09 PROCEDURE — 84443 ASSAY THYROID STIM HORMONE: CPT

## 2021-07-09 PROCEDURE — 86803 HEPATITIS C AB TEST: CPT

## 2021-07-09 PROCEDURE — 80053 COMPREHEN METABOLIC PANEL: CPT

## 2021-07-09 PROCEDURE — 80061 LIPID PANEL: CPT

## 2021-07-09 PROCEDURE — 82306 VITAMIN D 25 HYDROXY: CPT

## 2021-07-09 PROCEDURE — 36415 COLL VENOUS BLD VENIPUNCTURE: CPT

## 2022-08-02 ENCOUNTER — OFFICE VISIT (OUTPATIENT)
Dept: FAMILY MEDICINE CLINIC | Age: 52
End: 2022-08-02
Payer: MEDICAID

## 2022-08-02 VITALS
DIASTOLIC BLOOD PRESSURE: 66 MMHG | HEIGHT: 62 IN | HEART RATE: 72 BPM | WEIGHT: 185.4 LBS | TEMPERATURE: 96.9 F | SYSTOLIC BLOOD PRESSURE: 112 MMHG | OXYGEN SATURATION: 99 % | BODY MASS INDEX: 34.12 KG/M2 | RESPIRATION RATE: 16 BRPM

## 2022-08-02 DIAGNOSIS — Z13.220 ENCOUNTER FOR SCREENING FOR LIPOID DISORDERS: ICD-10-CM

## 2022-08-02 DIAGNOSIS — Z12.11 SCREEN FOR COLON CANCER: ICD-10-CM

## 2022-08-02 DIAGNOSIS — E55.9 VITAMIN D DEFICIENCY: Primary | ICD-10-CM

## 2022-08-02 DIAGNOSIS — Z00.00 WELLNESS EXAMINATION: ICD-10-CM

## 2022-08-02 PROCEDURE — 99212 OFFICE O/P EST SF 10 MIN: CPT | Performed by: FAMILY MEDICINE

## 2022-08-02 PROCEDURE — G8417 CALC BMI ABV UP PARAM F/U: HCPCS | Performed by: FAMILY MEDICINE

## 2022-08-02 PROCEDURE — 1036F TOBACCO NON-USER: CPT | Performed by: FAMILY MEDICINE

## 2022-08-02 PROCEDURE — G8427 DOCREV CUR MEDS BY ELIG CLIN: HCPCS | Performed by: FAMILY MEDICINE

## 2022-08-02 PROCEDURE — 99214 OFFICE O/P EST MOD 30 MIN: CPT | Performed by: FAMILY MEDICINE

## 2022-08-02 RX ORDER — ORPHENADRINE CITRATE 100 MG/1
100 TABLET, EXTENDED RELEASE ORAL 2 TIMES DAILY
Qty: 60 TABLET | Refills: 0 | Status: CANCELLED | OUTPATIENT
Start: 2022-08-02 | End: 2022-09-01

## 2022-08-02 SDOH — ECONOMIC STABILITY: FOOD INSECURITY: WITHIN THE PAST 12 MONTHS, YOU WORRIED THAT YOUR FOOD WOULD RUN OUT BEFORE YOU GOT MONEY TO BUY MORE.: NEVER TRUE

## 2022-08-02 SDOH — ECONOMIC STABILITY: FOOD INSECURITY: WITHIN THE PAST 12 MONTHS, THE FOOD YOU BOUGHT JUST DIDN'T LAST AND YOU DIDN'T HAVE MONEY TO GET MORE.: NEVER TRUE

## 2022-08-02 ASSESSMENT — ENCOUNTER SYMPTOMS
DIARRHEA: 0
SHORTNESS OF BREATH: 0
PHOTOPHOBIA: 0
WHEEZING: 0
CHEST TIGHTNESS: 0
COUGH: 0
CHOKING: 0
ABDOMINAL PAIN: 0
CONSTIPATION: 0
VOMITING: 0
NAUSEA: 0

## 2022-08-02 ASSESSMENT — PATIENT HEALTH QUESTIONNAIRE - PHQ9
SUM OF ALL RESPONSES TO PHQ9 QUESTIONS 1 & 2: 1
SUM OF ALL RESPONSES TO PHQ QUESTIONS 1-9: 1
2. FEELING DOWN, DEPRESSED OR HOPELESS: 1
SUM OF ALL RESPONSES TO PHQ QUESTIONS 1-9: 1
1. LITTLE INTEREST OR PLEASURE IN DOING THINGS: 0
SUM OF ALL RESPONSES TO PHQ QUESTIONS 1-9: 1
SUM OF ALL RESPONSES TO PHQ QUESTIONS 1-9: 1

## 2022-08-02 ASSESSMENT — SOCIAL DETERMINANTS OF HEALTH (SDOH): HOW HARD IS IT FOR YOU TO PAY FOR THE VERY BASICS LIKE FOOD, HOUSING, MEDICAL CARE, AND HEATING?: NOT HARD AT ALL

## 2022-08-02 NOTE — PROGRESS NOTES
Name: Villa Perdomo  DOS: 8/2/2022  MRN: [id number]      Subjective:  Villa Perdomo is a 54 y.o. 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 She wonders if weight/stress related. Shoulder Pain     Rigth shoulder pain. Lifting Father while caring for him    Other    Obesity     Discuss weight loss- possibly refer to nutritionist.        Vitals:    08/02/22 1001   BP: 112/66   Pulse: 72   Resp: 16   Temp: 96.9 °F (36.1 °C)   SpO2: 99%     No Known Allergies  No past medical history on file. Past Surgical History:   Procedure Laterality Date    CERVICAL POLYP REMOVAL      CHOLECYSTECTOMY      in [facility]     Social History     Socioeconomic History    Marital status: Single   Tobacco Use    Smoking status: Never    Smokeless tobacco: Never   Substance and Sexual Activity    Alcohol use: Never    Drug use: Never     Social Determinants of Health     Financial Resource Strain: Low Risk     Difficulty of Paying Living Expenses: Not hard at all   Food Insecurity: No Food Insecurity    Worried About Running Out of Food in the Last Year: Never true    Ran Out of Food in the Last Year: Never true       Current Outpatient Medications   Medication Sig Dispense Refill    orphenadrine (NORFLEX) 100 MG extended release tablet Take 100 mg by mouth in the morning and 100 mg before bedtime. cyclobenzaprine (FLEXERIL) 10 MG tablet Take 10 mg by mouth 3 times daily as needed for Muscle spasms      Omega-3 1000 MG CAPS Take by mouth      CINNAMON PO Take by mouth      butalbital-APAP-caffeine (FIORICET) -40 MG CAPS per capsule Take 1 capsule by mouth every 4 hours as needed for Headaches (Patient not taking: Reported on 8/2/2022) 30 capsule 0     No current facility-administered medications for this visit. Objective:  Pt is here to be established.  Pt  has an interpretor with her by video remote interpreting. She came to have a yearly routine check up. She wants lab work done. Pt takes norflex because she has because of her job. She was getting neck pain dn muscle pain. She was not taking it if it wasn't necessary. She says she doesn't need it now. Pt lost weight on purpose she stopped soda and bread, she made changes to her diet. She also took care of her father so she did not eat past 6:30 pm. Not eating rice anymore. Sh understands she lost weight because she changed her eating habits. Review of Systems   Constitutional:  Positive for fatigue (sometimes). Negative for unexpected weight change. Eyes:  Negative for photophobia and visual disturbance. Respiratory:  Negative for cough, choking, chest tightness, shortness of breath and wheezing. Cardiovascular:  Negative for chest pain, palpitations and leg swelling. Gastrointestinal:  Negative for abdominal pain, constipation, diarrhea, nausea and vomiting. Genitourinary:  Negative for difficulty urinating, hematuria, vaginal bleeding, vaginal discharge and vaginal pain. Musculoskeletal:  Negative for arthralgias and myalgias. Skin:  Negative for rash and wound. Neurological:  Negative for dizziness, tremors, seizures, syncope, speech difficulty, weakness, light-headedness, numbness and headaches. Hematological:  Negative for adenopathy. Does not bruise/bleed easily. Psychiatric/Behavioral:  Negative for agitation, confusion, decreased concentration, self-injury, sleep disturbance and suicidal ideas. The patient is not nervous/anxious. Physical Exam  Constitutional:       General: She is not in acute distress. Appearance: Normal appearance. She is not ill-appearing, toxic-appearing or diaphoretic. HENT:      Head: Normocephalic and atraumatic. Right Ear: Tympanic membrane, ear canal and external ear normal. There is no impacted cerumen.       Left Ear: Tympanic membrane, ear canal and external ear normal. There is no impacted cerumen. Nose: Nose normal. No congestion or rhinorrhea. Mouth/Throat:      Mouth: Mucous membranes are moist.      Pharynx: Oropharynx is clear. No oropharyngeal exudate or posterior oropharyngeal erythema. Eyes:      Extraocular Movements: Extraocular movements intact. Conjunctiva/sclera: Conjunctivae normal.      Pupils: Pupils are equal, round, and reactive to light. Cardiovascular:      Rate and Rhythm: Normal rate and regular rhythm. Pulses: Normal pulses. Heart sounds: Normal heart sounds. No murmur heard. Pulmonary:      Effort: Pulmonary effort is normal.      Breath sounds: Normal breath sounds. No wheezing, rhonchi or rales. Abdominal:      General: Abdomen is flat. Bowel sounds are normal. There is no distension. Palpations: Abdomen is soft. There is no mass. Tenderness: There is no abdominal tenderness. There is no guarding. Musculoskeletal:         General: Normal range of motion. Cervical back: Normal range of motion and neck supple. Right lower leg: No edema. Left lower leg: No edema. Lymphadenopathy:      Cervical: No cervical adenopathy. Skin:     General: Skin is warm. Capillary Refill: Capillary refill takes less than 2 seconds. Neurological:      General: No focal deficit present. Mental Status: She is alert and oriented to person, place, and time. Motor: No weakness. Coordination: Coordination normal.      Gait: Gait normal.   Psychiatric:         Mood and Affect: Mood normal.         Behavior: Behavior normal.         Thought Content: Thought content normal.        Assessment:   Diagnosis Orders   1. Vitamin D deficiency  Vitamin D 25 Hydroxy      2. Screen for colon cancer  Ambulatory referral to General Surgery      3. Encounter for screening for lipoid disorders  Lipid Panel      4.  Wellness examination  Ambulatory referral to General Surgery    CBC with Auto Differential Comprehensive Metabolic Panel    Lipid Panel    TSH with Reflex    Vitamin D 25 Hydroxy            Plan:  Orders Placed This Encounter   Procedures    CBC with Auto Differential     Standing Status:   Future     Standing Expiration Date:   9/2/2022    Comprehensive Metabolic Panel     Standing Status:   Future     Standing Expiration Date:   9/2/2022    Lipid Panel     Standing Status:   Future     Standing Expiration Date:   9/2/2022     Order Specific Question:   Is Patient Fasting?/# of Hours     Answer:   Yes    TSH with Reflex     Standing Status:   Future     Standing Expiration Date:   9/2/2022    Vitamin D 25 Hydroxy     Standing Status:   Future     Standing Expiration Date:   9/2/2022    Ambulatory referral to General Surgery     Referral Priority:   Routine     Referral Type:   Eval and Treat     Referral Reason:   Specialty Services Required     Referred to Provider:   Lu Paul DO     Requested Specialty:   General Surgery     Number of Visits Requested:   1           Patient Instructions   Nutrition Health Education:    Keep hydrated, walk 30 minutes minimum 3 times weekly as tolerated. Diet should consist of low fat, low sodium and high fiber. Nutritious foods such as fruits (if you're not diabetic), vegetables, lean meats, lean dairy, whole grains such as brown rice, quinoa, and dry beans. Fleurette Velez with small amounts of heart healthy extra virgin olive oil. Be watchful of any extra salt/sugar/seasoning to your food. You should eat no more than 2 grams or 2,000 mg of salt daily. Salt will raise your BP. Avoid regular/diet sodas, caffeine, energy drinks as these are full of artificial ingredients/sweeteners, sugar, salt and chemicals that spike insulin and are harmful to your health. Sugar intake increases metabolic disfunction, type 2 diabetes, insulin resistance, addictive food behavior and obesity.  Avoid all processed foods, foods from boxes, cans, microwave meals as these contain high salt, sugar or fat content and not much nutrition. Get at least 8 hrs of sleep every night and turn off all electronics at least 1 hour before bedtime as these decreases melatonin production and increases wakefulness. If your cholesterol is high, no greasy, fried, fast or fatty foods. Decrease red meat intake. No butter, love, lard or creams, no milk as these things clog your arteries and leads to heart attacks and death. If you smoke, smoking increases risk of lung disease, cancers, high BP, heart attack, stroke and death. Take your daily medications as prescribed and inform your family doctor if you are having any side effects or issues taking medications. Start vit D3 2,000IU every day  Start calcium 600 mg one pill twice a day    Refer to general surgeon for screening colonoscopy    Fasting blood work next  week follow up next week     Return in about 2 weeks (around 8/16/2022) for REVIEW LABS.      Soto Ghosh,

## 2022-08-02 NOTE — PATIENT INSTRUCTIONS
Nutrition Health Education:    Keep hydrated, walk 30 minutes minimum 3 times weekly as tolerated. Diet should consist of low fat, low sodium and high fiber. Nutritious foods such as fruits (if you're not diabetic), vegetables, lean meats, lean dairy, whole grains such as brown rice, quinoa, and dry beans. Reji Ego with small amounts of heart healthy extra virgin olive oil. Be watchful of any extra salt/sugar/seasoning to your food. You should eat no more than 2 grams or 2,000 mg of salt daily. Salt will raise your BP. Avoid regular/diet sodas, caffeine, energy drinks as these are full of artificial ingredients/sweeteners, sugar, salt and chemicals that spike insulin and are harmful to your health. Sugar intake increases metabolic disfunction, type 2 diabetes, insulin resistance, addictive food behavior and obesity. Avoid all processed foods, foods from boxes, cans, microwave meals as these contain high salt, sugar or fat content and not much nutrition. Get at least 8 hrs of sleep every night and turn off all electronics at least 1 hour before bedtime as these decreases melatonin production and increases wakefulness. If your cholesterol is high, no greasy, fried, fast or fatty foods. Decrease red meat intake. No butter, love, lard or creams, no milk as these things clog your arteries and leads to heart attacks and death. If you smoke, smoking increases risk of lung disease, cancers, high BP, heart attack, stroke and death. Take your daily medications as prescribed and inform your family doctor if you are having any side effects or issues taking medications.      Start vit D3 2,000IU every day  Start calcium 600 mg one pill twice a day    Refer to general surgeon for screening colonoscopy    Fasting blood work next  week follow up next week

## 2022-08-08 ENCOUNTER — HOSPITAL ENCOUNTER (OUTPATIENT)
Dept: LAB | Age: 52
Discharge: HOME | End: 2022-08-08
Payer: MEDICAID

## 2022-08-08 DIAGNOSIS — Z00.00 WELLNESS EXAMINATION: ICD-10-CM

## 2022-08-08 DIAGNOSIS — Z13.220 ENCOUNTER FOR SCREENING FOR LIPOID DISORDERS: ICD-10-CM

## 2022-08-08 DIAGNOSIS — E55.9 VITAMIN D DEFICIENCY: ICD-10-CM

## 2022-08-08 LAB
ABSOLUTE EOS #: 0.23 K/UL (ref 0–0.44)
ABSOLUTE IMMATURE GRANULOCYTE: <0.03 K/UL (ref 0–0.3)
ABSOLUTE LYMPH #: 1.91 K/UL (ref 1.1–3.7)
ABSOLUTE MONO #: 0.45 K/UL (ref 0.1–1.2)
ALBUMIN SERPL-MCNC: 4.3 G/DL (ref 3.5–5.2)
ALBUMIN/GLOBULIN RATIO: 1.6 (ref 1–2.5)
ALP BLD-CCNC: 79 U/L (ref 35–104)
ALT SERPL-CCNC: 12 U/L (ref 5–33)
ANION GAP SERPL CALCULATED.3IONS-SCNC: 10 MMOL/L (ref 9–17)
AST SERPL-CCNC: 15 U/L
BASOPHILS # BLD: 1 % (ref 0–2)
BASOPHILS ABSOLUTE: 0.05 K/UL (ref 0–0.2)
BILIRUB SERPL-MCNC: 0.28 MG/DL (ref 0.3–1.2)
BUN BLDV-MCNC: 16 MG/DL (ref 6–20)
BUN/CREAT BLD: 26 (ref 9–20)
CALCIUM SERPL-MCNC: 9.5 MG/DL (ref 8.6–10.4)
CHLORIDE BLD-SCNC: 103 MMOL/L (ref 98–107)
CHOLESTEROL/HDL RATIO: 2.4
CHOLESTEROL: 160 MG/DL
CO2: 27 MMOL/L (ref 20–31)
CREAT SERPL-MCNC: 0.61 MG/DL (ref 0.5–0.9)
EOSINOPHILS RELATIVE PERCENT: 4 % (ref 1–4)
GFR AFRICAN AMERICAN: >60 ML/MIN
GFR NON-AFRICAN AMERICAN: >60 ML/MIN
GFR SERPL CREATININE-BSD FRML MDRD: ABNORMAL ML/MIN/{1.73_M2}
GLUCOSE BLD-MCNC: 88 MG/DL (ref 70–99)
HCT VFR BLD CALC: 43.7 % (ref 36.3–47.1)
HDLC SERPL-MCNC: 67 MG/DL
HEMOGLOBIN: 14.4 G/DL (ref 11.9–15.1)
IMMATURE GRANULOCYTES: 0 %
LDL CHOLESTEROL: 69 MG/DL (ref 0–130)
LYMPHOCYTES # BLD: 36 % (ref 24–43)
MCH RBC QN AUTO: 30.1 PG (ref 25.2–33.5)
MCHC RBC AUTO-ENTMCNC: 33 G/DL (ref 25.2–33.5)
MCV RBC AUTO: 91.2 FL (ref 82.6–102.9)
MONOCYTES # BLD: 8 % (ref 3–12)
NRBC AUTOMATED: 0 PER 100 WBC
PDW BLD-RTO: 14 % (ref 11.8–14.4)
PLATELET # BLD: 276 K/UL (ref 138–453)
PMV BLD AUTO: 10 FL (ref 8.1–13.5)
POTASSIUM SERPL-SCNC: 4.3 MMOL/L (ref 3.7–5.3)
RBC # BLD: 4.79 M/UL (ref 3.95–5.11)
SEG NEUTROPHILS: 51 % (ref 36–65)
SEGMENTED NEUTROPHILS ABSOLUTE COUNT: 2.68 K/UL (ref 1.5–8.1)
SODIUM BLD-SCNC: 140 MMOL/L (ref 135–144)
TOTAL PROTEIN: 7 G/DL (ref 6.4–8.3)
TRIGL SERPL-MCNC: 120 MG/DL
TSH SERPL DL<=0.05 MIU/L-ACNC: 2.83 UIU/ML (ref 0.3–5)
VITAMIN D 25-HYDROXY: 26.7 NG/ML
WBC # BLD: 5.3 K/UL (ref 3.5–11.3)

## 2022-08-08 PROCEDURE — 84443 ASSAY THYROID STIM HORMONE: CPT

## 2022-08-08 PROCEDURE — 80053 COMPREHEN METABOLIC PANEL: CPT

## 2022-08-08 PROCEDURE — 80061 LIPID PANEL: CPT

## 2022-08-08 PROCEDURE — 82306 VITAMIN D 25 HYDROXY: CPT

## 2022-08-08 PROCEDURE — 85025 COMPLETE CBC W/AUTO DIFF WBC: CPT

## 2022-08-08 PROCEDURE — 36415 COLL VENOUS BLD VENIPUNCTURE: CPT

## 2022-08-16 ENCOUNTER — OFFICE VISIT (OUTPATIENT)
Dept: FAMILY MEDICINE CLINIC | Age: 52
End: 2022-08-16
Payer: MEDICAID

## 2022-08-16 VITALS
BODY MASS INDEX: 34.04 KG/M2 | RESPIRATION RATE: 16 BRPM | HEIGHT: 62 IN | TEMPERATURE: 96.9 F | SYSTOLIC BLOOD PRESSURE: 102 MMHG | WEIGHT: 185 LBS | HEART RATE: 69 BPM | OXYGEN SATURATION: 98 % | DIASTOLIC BLOOD PRESSURE: 60 MMHG

## 2022-08-16 DIAGNOSIS — E55.9 VITAMIN D DEFICIENCY: Primary | ICD-10-CM

## 2022-08-16 DIAGNOSIS — Z78.0 POST-MENOPAUSAL: ICD-10-CM

## 2022-08-16 PROCEDURE — G8417 CALC BMI ABV UP PARAM F/U: HCPCS | Performed by: FAMILY MEDICINE

## 2022-08-16 PROCEDURE — 99212 OFFICE O/P EST SF 10 MIN: CPT | Performed by: FAMILY MEDICINE

## 2022-08-16 PROCEDURE — G8427 DOCREV CUR MEDS BY ELIG CLIN: HCPCS | Performed by: FAMILY MEDICINE

## 2022-08-16 PROCEDURE — 99214 OFFICE O/P EST MOD 30 MIN: CPT | Performed by: FAMILY MEDICINE

## 2022-08-16 PROCEDURE — 1036F TOBACCO NON-USER: CPT | Performed by: FAMILY MEDICINE

## 2022-08-16 RX ORDER — ACETAMINOPHEN 325 MG/1
650 TABLET ORAL EVERY 6 HOURS PRN
COMMUNITY

## 2022-08-16 ASSESSMENT — ENCOUNTER SYMPTOMS
SHORTNESS OF BREATH: 0
ABDOMINAL PAIN: 0
CHEST TIGHTNESS: 0
CHOKING: 0
WHEEZING: 0
PHOTOPHOBIA: 0
COUGH: 0

## 2022-08-16 ASSESSMENT — PATIENT HEALTH QUESTIONNAIRE - PHQ9
2. FEELING DOWN, DEPRESSED OR HOPELESS: 0
SUM OF ALL RESPONSES TO PHQ9 QUESTIONS 1 & 2: 0
SUM OF ALL RESPONSES TO PHQ QUESTIONS 1-9: 0
1. LITTLE INTEREST OR PLEASURE IN DOING THINGS: 0

## 2022-08-16 NOTE — PATIENT INSTRUCTIONS
Nutrition Health Education:    Keep hydrated, walk 30 minutes minimum 3 times weekly as tolerated. Diet should consist of low fat, low sodium and high fiber. Nutritious foods such as fruits (if you're not diabetic), vegetables, lean meats, lean dairy, whole grains such as brown rice, quinoa, and dry beans. Stephen Brunner with small amounts of heart healthy extra virgin olive oil. Be watchful of any extra salt/sugar/seasoning to your food. You should eat no more than 2 grams or 2,000 mg of salt daily. Salt will raise your BP. Avoid regular/diet sodas, caffeine, energy drinks as these are full of artificial ingredients/sweeteners, sugar, salt and chemicals that spike insulin and are harmful to your health. Sugar intake increases metabolic disfunction, type 2 diabetes, insulin resistance, addictive food behavior and obesity. Avoid all processed foods, foods from boxes, cans, microwave meals as these contain high salt, sugar or fat content and not much nutrition. Get at least 8 hrs of sleep every night and turn off all electronics at least 1 hour before bedtime as these decreases melatonin production and increases wakefulness. If your cholesterol is high, no greasy, fried, fast or fatty foods. Decrease red meat intake. No butter, love, lard or creams, no milk as these things clog your arteries and leads to heart attacks and death. If you smoke, smoking increases risk of lung disease, cancers, high BP, heart attack, stroke and death. Take your daily medications as prescribed and inform your family doctor if you are having any side effects or issues taking medications.      Reviewed labs with pt cbc,cmp,lipids,tsh,vit d    Start vit D3 2,000IU every day  Start calcium 600 mg one pill twice a day  Repeat vit d level in 3 months    Ordered a bone density test    Return to office for pap smear

## 2022-08-16 NOTE — PROGRESS NOTES
Name: Marlon Jones  DOS: 8/16/2022  MRN: [id number]      Subjective:  Marlon Jones is a 54 y.o. female being seen for   Chief Complaint   Patient presents with    Other     Vitamin D Deficiency- 8/8/2022= 26.7       Vitals:    08/16/22 1127   BP: 102/60   Pulse: 69   Resp: 16   Temp: 96.9 °F (36.1 °C)   SpO2: 98%     No Known Allergies  No past medical history on file. Past Surgical History:   Procedure Laterality Date    CERVICAL POLYP REMOVAL      CHOLECYSTECTOMY      in [facility]     Social History     Socioeconomic History    Marital status: Single   Tobacco Use    Smoking status: Never    Smokeless tobacco: Never   Substance and Sexual Activity    Alcohol use: Never    Drug use: Never     Social Determinants of Health     Financial Resource Strain: Low Risk     Difficulty of Paying Living Expenses: Not hard at all   Food Insecurity: No Food Insecurity    Worried About Running Out of Food in the Last Year: Never true    Ran Out of Food in the Last Year: Never true       Current Outpatient Medications   Medication Sig Dispense Refill    acetaminophen (TYLENOL) 325 MG tablet Take 650 mg by mouth every 6 hours as needed for Pain       No current facility-administered medications for this visit. Objective:  Pt here with video . She has had a colonoscopy 3 years ago in [facility] and was normal.  Pt admits to forgetting to take calcium and vit D    Review of Systems   Constitutional:  Positive for fatigue (sometimes, depends if she works). Negative for unexpected weight change. Eyes:  Negative for photophobia and visual disturbance. Respiratory:  Negative for cough, choking, chest tightness, shortness of breath and wheezing. Cardiovascular:  Negative for chest pain, palpitations and leg swelling. Gastrointestinal:  Negative for abdominal pain. Genitourinary:  Negative for difficulty urinating and hematuria. Skin:  Negative for rash and wound.    Neurological:  Negative General: No focal deficit present. Mental Status: She is alert and oriented to person, place, and time. Motor: No weakness. Coordination: Coordination normal.      Gait: Gait normal.   Psychiatric:         Mood and Affect: Mood normal.         Behavior: Behavior normal.         Thought Content: Thought content normal.        Assessment:   Diagnosis Orders   1. Vitamin D deficiency  Vitamin D 25 Hydroxy      2. Post-menopausal  DEXA BONE DENSITY 2 SITES            Plan:  Orders Placed This Encounter   Procedures    DEXA BONE DENSITY 2 SITES     Standing Status:   Future     Standing Expiration Date:   9/16/2022     Order Specific Question:   Reason for exam:     Answer:   post menopausal    Vitamin D 25 Hydroxy     Standing Status:   Future     Standing Expiration Date:   12/16/2022         Patient Instructions   Nutrition Health Education:    Keep hydrated, walk 30 minutes minimum 3 times weekly as tolerated. Diet should consist of low fat, low sodium and high fiber. Nutritious foods such as fruits (if you're not diabetic), vegetables, lean meats, lean dairy, whole grains such as brown rice, quinoa, and dry beans. Fleurette Velez with small amounts of heart healthy extra virgin olive oil. Be watchful of any extra salt/sugar/seasoning to your food. You should eat no more than 2 grams or 2,000 mg of salt daily. Salt will raise your BP. Avoid regular/diet sodas, caffeine, energy drinks as these are full of artificial ingredients/sweeteners, sugar, salt and chemicals that spike insulin and are harmful to your health. Sugar intake increases metabolic disfunction, type 2 diabetes, insulin resistance, addictive food behavior and obesity. Avoid all processed foods, foods from boxes, cans, microwave meals as these contain high salt, sugar or fat content and not much nutrition.  Get at least 8 hrs of sleep every night and turn off all electronics at least 1 hour before bedtime as these decreases melatonin production and

## 2022-08-23 ENCOUNTER — HOSPITAL ENCOUNTER (OUTPATIENT)
Dept: BONE DENSITY | Age: 52
Discharge: HOME | End: 2022-08-25
Payer: MEDICAID

## 2022-08-23 DIAGNOSIS — Z78.0 POST-MENOPAUSAL: ICD-10-CM

## 2022-08-23 PROCEDURE — 77085 DXA BONE DENSITY AXL VRT FX: CPT

## 2022-10-25 ENCOUNTER — OFFICE VISIT (OUTPATIENT)
Dept: OPTOMETRY | Age: 52
End: 2022-10-25
Payer: MEDICAID

## 2022-10-25 DIAGNOSIS — H52.4 PRESBYOPIA OF BOTH EYES: Primary | ICD-10-CM

## 2022-10-25 PROCEDURE — 92014 COMPRE OPH EXAM EST PT 1/>: CPT | Performed by: OPTOMETRIST

## 2022-10-25 PROCEDURE — 92083 EXTENDED VISUAL FIELD XM: CPT | Performed by: OPTOMETRIST

## 2022-10-25 PROCEDURE — 92015 DETERMINE REFRACTIVE STATE: CPT | Performed by: OPTOMETRIST

## 2022-10-25 ASSESSMENT — REFRACTION_MANIFEST
OS_CYLINDER: -0.25
OS_ADD: +1.75
OS_SPHERE: PLANO
OD_CYLINDER: DS
OS_AXIS: 075
OD_ADD: +1.75
OD_SPHERE: -0.25

## 2022-10-25 ASSESSMENT — ENCOUNTER SYMPTOMS
ALLERGIC/IMMUNOLOGIC NEGATIVE: 0
EYES NEGATIVE: 0
GASTROINTESTINAL NEGATIVE: 0
RESPIRATORY NEGATIVE: 0

## 2022-10-25 ASSESSMENT — REFRACTION_WEARINGRX
OD_CYLINDER: DS
OS_SPHERE: PLANO
OD_SPHERE: -0.25
OS_CYLINDER: -0.25
OS_ADD: +1.75
OS_AXIS: 075
OD_ADD: +1.75

## 2022-10-25 ASSESSMENT — VISUAL ACUITY
OD_PH_SC: 20/30 OU
OS_SC+: -2
OD_SC+: -2
OS_SC: 20/30
OD_SC: 20/30
OD_PH_SC+: -2
OD_SC: 20/40 OU
METHOD: SNELLEN - LINEAR

## 2022-10-25 ASSESSMENT — TONOMETRY
OS_IOP_MMHG: 20
OD_IOP_MMHG: 20
IOP_METHOD: NON-CONTACT AIR PUFF

## 2022-10-25 ASSESSMENT — SLIT LAMP EXAM - LIDS
COMMENTS: NORMAL
COMMENTS: NORMAL

## 2022-10-25 NOTE — PROGRESS NOTES
Master Brown presents today for   Chief Complaint   Patient presents with    Vision Exam     Last Vision Exam: 2/24/2020  Last Ophthalmology Exam: na  Last Filled Glasses Rx: Insurance: [employer] March  Update: Exam and glasses. Blurred Vision   . HPI       Vision Exam              Comments: Last Vision Exam: 2/24/2020  Last Ophthalmology Exam: na  Last Filled Glasses Rx: Insurance: [employer] March  Update: Exam and glasses. Blurred Vision              Laterality: both eyes    Context: distance vision and reading    Associated symptoms: tearing    Associated symptoms: tearing              Comments    Glasses are broken  No glasses for about a year                   Current Outpatient Medications   Medication Sig Dispense Refill    acetaminophen (TYLENOL) 325 MG tablet Take 650 mg by mouth every 6 hours as needed for Pain       No current facility-administered medications for this visit. Family History   Problem Relation Age of Onset    Cataracts Neg Hx     Diabetes Neg Hx     Glaucoma Neg Hx      Social History     Socioeconomic History    Marital status: Single   Tobacco Use    Smoking status: Never    Smokeless tobacco: Never   Substance and Sexual Activity    Alcohol use: Never    Drug use: Never     Social Determinants of Health     Financial Resource Strain: Low Risk     Difficulty of Paying Living Expenses: Not hard at all   Food Insecurity: No Food Insecurity    Worried About Running Out of Food in the Last Year: Never true    Ran Out of Food in the Last Year: Never true     No past medical history on file.     ROS    Negative for: Constitutional, Gastrointestinal, Neurological, Skin, Genitourinary, Musculoskeletal, HENT, Endocrine, Cardiovascular, Eyes, Respiratory, Psychiatric, Allergic/Imm, Heme/Lymph         Main Ophthalmology Exam       External Exam         Right Left    External Normal Normal              Slit Lamp Exam         Right Left    Lids/Lashes Normal Normal    Conjunctiva/Sclera White and quiet White and quiet    Cornea Clear Clear    Anterior Chamber Deep and quiet Deep and quiet    Iris Round and reactive Round and reactive    Lens Trace Nuclear sclerosis Trace Nuclear sclerosis    Vitreous Normal Normal              Fundus Exam         Right Left    Disc Normal Normal    C/D Ratio 0.35 0.35    Macula Normal Normal    Vessels Normal Normal                   <div id=\"MAIN_EXAM_REVIEWED\"></div>      Tonometry       Tonometry (Non-contact air puff, 11:09 AM)         Right Left    Pressure 20 20   IOP.8             17.9  CH:  8.3          8.5  WS: 4.6          8.6                   Not recorded       Not recorded         Visual Acuity (Snellen - Linear)         Right Left    Dist sc 20/30 -2 20/30 -2    Dist ph sc 20/30 OU -2     Near sc 20/40 OU             Not recorded       Neuro/Psych       Neuro/Psych       Oriented x3: Yes    Mood/Affect: Normal                  Not recorded           Ophthalmology Exam       Wearing Rx         Sphere Cylinder Axis Add    Right -0.25 ds  +1.75    Left [place] -0.25 075 +1.75                  Manifest Refraction       Manifest Refraction         Sphere Cylinder Axis Dist VA Add    Right -0.25 ds  20/30+ +1.75    Left [place] -0.25 075 20/30+ +1.75              Manifest Refraction #2 (Auto)         Sphere Cylinder Axis Dist VA Add    Right +0.00 -0.25 006      Left +0.25 -0.25 72                     Final Rx         Sphere Cylinder Axis Add    Right -0.25 ds  +1.75    Left [place] -0.25 075 +1.75      Type: Bifocal    Expiration Date: 10/25/2024          Final Rx #2         Sphere Cylinder Axis Add    Right +1.50 ds      Left +1.75 -0.25 075       Type: RRX     Expiration Date: 10/25/2024              No orders of the defined types were placed in this encounter. IMPRESSION:  1.  Presbyopia of both eyes [H52.4 (ICD-10-CM)]        PLAN:    Rx printed;  new glasses recommended;  patient does not want bifocals so a reading rx was also printed       There are no Patient Instructions on file for this visit.    Return in about 2 years (around 10/25/2024) for complete eye exam.

## 2022-11-07 ENCOUNTER — HOSPITAL ENCOUNTER (OUTPATIENT)
Dept: LAB | Age: 52
Discharge: HOME | End: 2022-11-07
Payer: MEDICAID

## 2022-11-07 DIAGNOSIS — E55.9 VITAMIN D DEFICIENCY: ICD-10-CM

## 2022-11-07 PROCEDURE — 82306 VITAMIN D 25 HYDROXY: CPT

## 2022-11-07 PROCEDURE — 36415 COLL VENOUS BLD VENIPUNCTURE: CPT

## 2022-11-08 LAB — VITAMIN D 25-HYDROXY: 32.5 NG/ML

## 2023-02-14 ENCOUNTER — TELEPHONE (OUTPATIENT)
Dept: FAMILY MEDICINE CLINIC | Age: 53
End: 2023-02-14

## 2023-02-14 NOTE — TELEPHONE ENCOUNTER
Left voicemail for patient to call to schedule appt to establish with [facility]- after 8/2/2023 for wellness.

## 2023-04-05 ENCOUNTER — HOSPITAL ENCOUNTER (EMERGENCY)
Age: 53
Discharge: HOME | End: 2023-04-05
Attending: EMERGENCY MEDICINE
Payer: MEDICAID

## 2023-04-05 ENCOUNTER — APPOINTMENT (OUTPATIENT)
Dept: GENERAL RADIOLOGY | Age: 53
End: 2023-04-05
Payer: MEDICAID

## 2023-04-05 VITALS
SYSTOLIC BLOOD PRESSURE: 112 MMHG | HEIGHT: 63 IN | DIASTOLIC BLOOD PRESSURE: 86 MMHG | BODY MASS INDEX: 37.21 KG/M2 | RESPIRATION RATE: 21 BRPM | HEART RATE: 72 BPM | WEIGHT: 210 LBS | TEMPERATURE: 98.8 F | OXYGEN SATURATION: 98 %

## 2023-04-05 DIAGNOSIS — K29.00 ACUTE SUPERFICIAL GASTRITIS WITHOUT HEMORRHAGE: ICD-10-CM

## 2023-04-05 DIAGNOSIS — R07.9 CHEST PAIN, UNSPECIFIED TYPE: Primary | ICD-10-CM

## 2023-04-05 LAB
ABSOLUTE EOS #: 0.27 K/UL (ref 0–0.4)
ABSOLUTE IMMATURE GRANULOCYTE: 0 K/UL (ref 0–0.3)
ABSOLUTE LYMPH #: 2.14 K/UL (ref 1–4.8)
ABSOLUTE MONO #: 0.47 K/UL (ref 0.1–1.2)
ALBUMIN SERPL-MCNC: 4.2 G/DL (ref 3.5–5.2)
ALBUMIN/GLOBULIN RATIO: 1.4 (ref 1–2.5)
ALP SERPL-CCNC: 103 U/L (ref 35–104)
ALT SERPL-CCNC: 23 U/L (ref 5–33)
ANION GAP SERPL CALCULATED.3IONS-SCNC: 8 MMOL/L (ref 9–17)
AST SERPL-CCNC: 21 U/L
BASOPHILS # BLD: 0 % (ref 0–1)
BASOPHILS ABSOLUTE: 0 K/UL (ref 0–0.2)
BILIRUB SERPL-MCNC: 0.2 MG/DL (ref 0.3–1.2)
BUN SERPL-MCNC: 16 MG/DL (ref 6–20)
BUN/CREAT BLD: 31 (ref 9–20)
CALCIUM SERPL-MCNC: 9.5 MG/DL (ref 8.6–10.4)
CHLORIDE SERPL-SCNC: 103 MMOL/L (ref 98–107)
CO2 SERPL-SCNC: 27 MMOL/L (ref 20–31)
CREAT SERPL-MCNC: 0.52 MG/DL (ref 0.5–0.9)
EOSINOPHILS RELATIVE PERCENT: 4 % (ref 1–7)
GFR SERPL CREATININE-BSD FRML MDRD: >60 ML/MIN/1.73M2
GLUCOSE SERPL-MCNC: 87 MG/DL (ref 70–99)
HCT VFR BLD AUTO: 41.3 % (ref 36.3–47.1)
HGB BLD-MCNC: 13.8 G/DL (ref 11.9–15.1)
IMMATURE GRANULOCYTES: 0 %
LYMPHOCYTES # BLD: 32 % (ref 16–46)
MAGNESIUM SERPL-MCNC: 1.8 MG/DL (ref 1.6–2.6)
MCH RBC QN AUTO: 30.5 PG (ref 25.2–33.5)
MCHC RBC AUTO-ENTMCNC: 33.4 G/DL (ref 25.2–33.5)
MCV RBC AUTO: 91.2 FL (ref 82.6–102.9)
MONOCYTES # BLD: 7 % (ref 4–11)
MORPHOLOGY: NORMAL
MORPHOLOGY: NORMAL
NRBC AUTOMATED: 0 PER 100 WBC
PDW BLD-RTO: 13.7 % (ref 11.8–14.4)
PLATELET # BLD AUTO: 264 K/UL (ref 138–453)
PMV BLD AUTO: 9.6 FL (ref 8.1–13.5)
POTASSIUM SERPL-SCNC: 4.3 MMOL/L (ref 3.7–5.3)
PROT SERPL-MCNC: 7.1 G/DL (ref 6.4–8.3)
RBC # BLD: 4.53 M/UL (ref 3.95–5.11)
SEG NEUTROPHILS: 57 % (ref 43–77)
SEGMENTED NEUTROPHILS ABSOLUTE COUNT: 3.82 K/UL (ref 1.5–8.1)
SODIUM SERPL-SCNC: 138 MMOL/L (ref 135–144)
TROPONIN I SERPL DL<=0.01 NG/ML-MCNC: <6 NG/L (ref 0–14)
WBC # BLD AUTO: 6.7 K/UL (ref 3.5–11.3)

## 2023-04-05 PROCEDURE — 6370000000 HC RX 637 (ALT 250 FOR IP): Performed by: EMERGENCY MEDICINE

## 2023-04-05 PROCEDURE — 83735 ASSAY OF MAGNESIUM: CPT

## 2023-04-05 PROCEDURE — 84484 ASSAY OF TROPONIN QUANT: CPT

## 2023-04-05 PROCEDURE — 80053 COMPREHEN METABOLIC PANEL: CPT

## 2023-04-05 PROCEDURE — 93005 ELECTROCARDIOGRAM TRACING: CPT | Performed by: EMERGENCY MEDICINE

## 2023-04-05 PROCEDURE — 85025 COMPLETE CBC W/AUTO DIFF WBC: CPT

## 2023-04-05 PROCEDURE — 71045 X-RAY EXAM CHEST 1 VIEW: CPT

## 2023-04-05 PROCEDURE — 36415 COLL VENOUS BLD VENIPUNCTURE: CPT

## 2023-04-05 RX ORDER — LIDOCAINE HYDROCHLORIDE 20 MG/ML
5 SOLUTION OROPHARYNGEAL
Status: DISCONTINUED | OUTPATIENT
Start: 2023-04-05 | End: 2023-04-05 | Stop reason: HOSPADM

## 2023-04-05 RX ORDER — MAGNESIUM HYDROXIDE/ALUMINUM HYDROXICE/SIMETHICONE 120; 1200; 1200 MG/30ML; MG/30ML; MG/30ML
15 SUSPENSION ORAL ONCE
Status: COMPLETED | OUTPATIENT
Start: 2023-04-05 | End: 2023-04-05

## 2023-04-05 RX ORDER — ASPIRIN 81 MG/1
324 TABLET, CHEWABLE ORAL ONCE
Status: COMPLETED | OUTPATIENT
Start: 2023-04-05 | End: 2023-04-05

## 2023-04-05 RX ORDER — SUCRALFATE 1 G/1
1 TABLET ORAL 3 TIMES DAILY
Qty: 30 TABLET | Refills: 0 | Status: SHIPPED | OUTPATIENT
Start: 2023-04-05 | End: 2023-04-07 | Stop reason: ALTCHOICE

## 2023-04-05 RX ADMIN — ALUMINUM HYDROXIDE, MAGNESIUM HYDROXIDE, AND SIMETHICONE 15 ML: 200; 200; 20 SUSPENSION ORAL at 12:46

## 2023-04-05 RX ADMIN — LIDOCAINE HYDROCHLORIDE 5 ML: 20 SOLUTION ORAL at 12:47

## 2023-04-05 RX ADMIN — ASPIRIN 81 MG 324 MG: 81 TABLET ORAL at 12:45

## 2023-04-05 ASSESSMENT — ENCOUNTER SYMPTOMS
SHORTNESS OF BREATH: 0
VOMITING: 0
DIARRHEA: 0
SORE THROAT: 0

## 2023-04-05 ASSESSMENT — PAIN DESCRIPTION - LOCATION
LOCATION: CHEST
LOCATION: CHEST

## 2023-04-05 ASSESSMENT — PAIN SCALES - GENERAL
PAINLEVEL_OUTOF10: 2
PAINLEVEL_OUTOF10: 8

## 2023-04-05 ASSESSMENT — PAIN - FUNCTIONAL ASSESSMENT
PAIN_FUNCTIONAL_ASSESSMENT: 0-10
PAIN_FUNCTIONAL_ASSESSMENT: ACTIVITIES ARE NOT PREVENTED
PAIN_FUNCTIONAL_ASSESSMENT: ACTIVITIES ARE NOT PREVENTED

## 2023-04-05 ASSESSMENT — PAIN DESCRIPTION - DESCRIPTORS
DESCRIPTORS: DISCOMFORT;PRESSURE
DESCRIPTORS: PRESSURE

## 2023-04-05 ASSESSMENT — PAIN DESCRIPTION - PAIN TYPE
TYPE: CHRONIC PAIN
TYPE: CHRONIC PAIN

## 2023-04-05 ASSESSMENT — PAIN DESCRIPTION - ONSET
ONSET: ON-GOING
ONSET: ON-GOING

## 2023-04-05 ASSESSMENT — PAIN DESCRIPTION - FREQUENCY: FREQUENCY: INTERMITTENT

## 2023-04-05 ASSESSMENT — PAIN DESCRIPTION - ORIENTATION
ORIENTATION: MID;RIGHT
ORIENTATION: MID

## 2023-04-05 NOTE — ED PROVIDER NOTES
Mouth: Mucous membranes are moist.   Eyes:      General:         Right eye: No discharge. Left eye: No discharge. Conjunctiva/sclera: Conjunctivae normal.   Cardiovascular:      Rate and Rhythm: Normal rate and regular rhythm. Pulses: Normal pulses. Heart sounds: Normal heart sounds. No murmur heard. Pulmonary:      Effort: Pulmonary effort is normal. No respiratory distress. Breath sounds: Normal breath sounds. No wheezing. Abdominal:      General: Abdomen is flat. There is no distension. Palpations: Abdomen is soft. There is no pulsatile mass. Tenderness: There is no abdominal tenderness. There is no guarding or rebound. Comments: No pulsatile mass   Musculoskeletal:         General: No deformity or signs of injury. Normal range of motion. Cervical back: Normal range of motion. Skin:     General: Skin is warm and dry. Capillary Refill: Capillary refill takes less than 2 seconds. Findings: No rash. Neurological:      General: No focal deficit present. Mental Status: She is alert. Mental status is at baseline. Motor: No weakness. Comments: Speaking normally. No facial asymmetry. Moving all 4 extremities. Normal gait. Psychiatric:         Mood and Affect: Mood normal.       EMERGENCY DEPARTMENT COURSE and DIFFERENTIAL DIAGNOSIS/MDM:   Vitals:    Vitals:    04/05/23 1300 04/05/23 1315 04/05/23 1330 04/05/23 1345   BP: 117/85 (!) 122/92 108/74 112/86   Pulse: 70 77 72 72   Resp: 16 24 20 21   Temp:       TempSrc:       SpO2: 98% 99% 99% 98%   Weight:       Height:           Patient presents to the emergency department with the complaint described above. Vital signs are normal.  She is nontoxic, resting comfortably, no distress.   At this time, I do have low suspicion this represents an acute coronary syndrome or significant intra-abdominal process but I am going to obtain chest x-ray and full metabolic work-up as well as EKG to further

## 2023-04-06 LAB
EKG ATRIAL RATE: 79 BPM
EKG P AXIS: 61 DEGREES
EKG P-R INTERVAL: 124 MS
EKG Q-T INTERVAL: 406 MS
EKG QRS DURATION: 90 MS
EKG QTC CALCULATION (BAZETT): 465 MS
EKG R AXIS: 35 DEGREES
EKG T AXIS: 31 DEGREES
EKG VENTRICULAR RATE: 79 BPM

## 2023-04-07 ENCOUNTER — OFFICE VISIT (OUTPATIENT)
Dept: FAMILY MEDICINE CLINIC | Age: 53
End: 2023-04-07
Payer: MEDICAID

## 2023-04-07 VITALS
RESPIRATION RATE: 18 BRPM | WEIGHT: 208 LBS | SYSTOLIC BLOOD PRESSURE: 108 MMHG | OXYGEN SATURATION: 94 % | BODY MASS INDEX: 36.86 KG/M2 | DIASTOLIC BLOOD PRESSURE: 62 MMHG | HEIGHT: 63 IN | HEART RATE: 73 BPM

## 2023-04-07 DIAGNOSIS — Z13.220 LIPID SCREENING: ICD-10-CM

## 2023-04-07 DIAGNOSIS — Z13.1 DIABETES MELLITUS SCREENING: ICD-10-CM

## 2023-04-07 DIAGNOSIS — R07.9 CHEST PAIN, UNSPECIFIED TYPE: Primary | ICD-10-CM

## 2023-04-07 DIAGNOSIS — M79.18 MYALGIA, MULTIPLE SITES: ICD-10-CM

## 2023-04-07 PROCEDURE — G8417 CALC BMI ABV UP PARAM F/U: HCPCS | Performed by: NURSE PRACTITIONER

## 2023-04-07 PROCEDURE — 1036F TOBACCO NON-USER: CPT | Performed by: NURSE PRACTITIONER

## 2023-04-07 PROCEDURE — 99214 OFFICE O/P EST MOD 30 MIN: CPT | Performed by: NURSE PRACTITIONER

## 2023-04-07 PROCEDURE — 3017F COLORECTAL CA SCREEN DOC REV: CPT | Performed by: NURSE PRACTITIONER

## 2023-04-07 PROCEDURE — G8427 DOCREV CUR MEDS BY ELIG CLIN: HCPCS | Performed by: NURSE PRACTITIONER

## 2023-04-07 ASSESSMENT — ENCOUNTER SYMPTOMS
BACK PAIN: 1
RESPIRATORY NEGATIVE: 1
ABDOMINAL DISTENTION: 1

## 2023-04-07 ASSESSMENT — PATIENT HEALTH QUESTIONNAIRE - PHQ9
SUM OF ALL RESPONSES TO PHQ QUESTIONS 1-9: 0
2. FEELING DOWN, DEPRESSED OR HOPELESS: 0
SUM OF ALL RESPONSES TO PHQ9 QUESTIONS 1 & 2: 0
SUM OF ALL RESPONSES TO PHQ QUESTIONS 1-9: 0
SUM OF ALL RESPONSES TO PHQ QUESTIONS 1-9: 0
1. LITTLE INTEREST OR PLEASURE IN DOING THINGS: 0
SUM OF ALL RESPONSES TO PHQ QUESTIONS 1-9: 0

## 2023-04-20 ENCOUNTER — OFFICE VISIT (OUTPATIENT)
Dept: CARDIOLOGY | Age: 53
End: 2023-04-20
Payer: MEDICAID

## 2023-04-20 VITALS
DIASTOLIC BLOOD PRESSURE: 90 MMHG | HEART RATE: 70 BPM | WEIGHT: 207 LBS | OXYGEN SATURATION: 97 % | HEIGHT: 63 IN | SYSTOLIC BLOOD PRESSURE: 138 MMHG | BODY MASS INDEX: 36.68 KG/M2

## 2023-04-20 DIAGNOSIS — R94.39 ABNORMAL STRESS TEST: Primary | ICD-10-CM

## 2023-04-20 PROCEDURE — G8417 CALC BMI ABV UP PARAM F/U: HCPCS | Performed by: INTERNAL MEDICINE

## 2023-04-20 PROCEDURE — 1036F TOBACCO NON-USER: CPT | Performed by: INTERNAL MEDICINE

## 2023-04-20 PROCEDURE — 99204 OFFICE O/P NEW MOD 45 MIN: CPT | Performed by: INTERNAL MEDICINE

## 2023-04-20 PROCEDURE — 3017F COLORECTAL CA SCREEN DOC REV: CPT | Performed by: INTERNAL MEDICINE

## 2023-04-20 PROCEDURE — G8427 DOCREV CUR MEDS BY ELIG CLIN: HCPCS | Performed by: INTERNAL MEDICINE

## 2023-04-20 NOTE — PROGRESS NOTES
patient. Assessment:   Episode of substernal chest pain  Abnormal ECG portion of stress test  Family history of CAD    Plan:   Coronary CTA for further risk stratification  Patient counseled regarding symptoms of ACS in which she will be significant medical attention      The patient was counseled regarding heart healthy diet, weight loss and exercise as tolerated. All questions and concerns were addressed to patient's satisfaction. The patient is to follow up in 3-6 months or sooner if necessary. Thank you for allowing me to participate in the care of this patient, please do not hesitate to call if you have any questions. Roxann Campbell MD, [address] [facility]  [facility]  [phone]

## 2023-05-31 ENCOUNTER — HOSPITAL ENCOUNTER (OUTPATIENT)
Dept: CT IMAGING | Age: 53
Discharge: HOME | End: 2023-06-02
Payer: COMMERCIAL

## 2023-05-31 VITALS — HEART RATE: 59 BPM | RESPIRATION RATE: 20 BRPM | DIASTOLIC BLOOD PRESSURE: 72 MMHG | SYSTOLIC BLOOD PRESSURE: 114 MMHG

## 2023-05-31 DIAGNOSIS — R94.39 ABNORMAL STRESS TEST: ICD-10-CM

## 2023-05-31 PROCEDURE — 6360000004 HC RX CONTRAST MEDICATION: Performed by: INTERNAL MEDICINE

## 2023-05-31 PROCEDURE — 75574 CT ANGIO HRT W/3D IMAGE: CPT

## 2023-05-31 PROCEDURE — 2580000003 HC RX 258: Performed by: INTERNAL MEDICINE

## 2023-05-31 PROCEDURE — 6370000000 HC RX 637 (ALT 250 FOR IP): Performed by: INTERNAL MEDICINE

## 2023-05-31 RX ORDER — 0.9 % SODIUM CHLORIDE 0.9 %
80 INTRAVENOUS SOLUTION INTRAVENOUS ONCE
Status: COMPLETED | OUTPATIENT
Start: 2023-05-31 | End: 2023-05-31

## 2023-05-31 RX ORDER — METOPROLOL TARTRATE 50 MG/1
100 TABLET, FILM COATED ORAL ONCE
Status: COMPLETED | OUTPATIENT
Start: 2023-05-31 | End: 2023-05-31

## 2023-05-31 RX ORDER — SODIUM CHLORIDE 0.9 % (FLUSH) 0.9 %
10 SYRINGE (ML) INJECTION PRN
Status: DISCONTINUED | OUTPATIENT
Start: 2023-05-31 | End: 2023-06-03 | Stop reason: HOSPADM

## 2023-05-31 RX ORDER — NITROGLYCERIN 0.4 MG/1
0.4 TABLET SUBLINGUAL
Status: COMPLETED | OUTPATIENT
Start: 2023-05-31 | End: 2023-05-31

## 2023-05-31 RX ADMIN — METOPROLOL 100 MG: 100 TABLET ORAL at 09:42

## 2023-05-31 RX ADMIN — SODIUM CHLORIDE, PRESERVATIVE FREE 10 ML: 5 INJECTION INTRAVENOUS at 10:49

## 2023-05-31 RX ADMIN — NITROGLYCERIN 0.4 MG: 0.4 TABLET, ORALLY DISINTEGRATING SUBLINGUAL at 10:47

## 2023-05-31 RX ADMIN — IOPAMIDOL 100 ML: 755 INJECTION, SOLUTION INTRAVENOUS at 10:43

## 2023-05-31 RX ADMIN — SODIUM CHLORIDE 80 ML: 9 INJECTION, SOLUTION INTRAVENOUS at 10:48

## 2023-07-24 ENCOUNTER — HOSPITAL ENCOUNTER (OUTPATIENT)
Age: 53
Discharge: HOME | End: 2023-07-24
Payer: COMMERCIAL

## 2023-07-24 DIAGNOSIS — Z13.1 DIABETES MELLITUS SCREENING: ICD-10-CM

## 2023-07-24 DIAGNOSIS — Z13.220 LIPID SCREENING: ICD-10-CM

## 2023-07-24 DIAGNOSIS — R07.9 CHEST PAIN, UNSPECIFIED TYPE: ICD-10-CM

## 2023-07-24 LAB
ALBUMIN SERPL-MCNC: 4.3 G/DL (ref 3.5–5.2)
ALBUMIN/GLOB SERPL: 1.5 {RATIO} (ref 1–2.5)
ALP SERPL-CCNC: 111 U/L (ref 35–104)
ALT SERPL-CCNC: 17 U/L (ref 5–33)
ANION GAP SERPL CALCULATED.3IONS-SCNC: 9 MMOL/L (ref 9–17)
AST SERPL-CCNC: 16 U/L
BILIRUB SERPL-MCNC: 0.3 MG/DL (ref 0.3–1.2)
BUN SERPL-MCNC: 20 MG/DL (ref 6–20)
BUN/CREAT SERPL: 33 (ref 9–20)
CALCIUM SERPL-MCNC: 9.4 MG/DL (ref 8.6–10.4)
CHLORIDE SERPL-SCNC: 105 MMOL/L (ref 98–107)
CHOLEST SERPL-MCNC: 166 MG/DL
CHOLESTEROL/HDL RATIO: 3.5
CO2 SERPL-SCNC: 28 MMOL/L (ref 20–31)
CREAT SERPL-MCNC: 0.6 MG/DL (ref 0.5–0.9)
EST. AVERAGE GLUCOSE BLD GHB EST-MCNC: 108 MG/DL
GFR SERPL CREATININE-BSD FRML MDRD: >60 ML/MIN/1.73M2
GLUCOSE SERPL-MCNC: 109 MG/DL (ref 70–99)
HBA1C MFR BLD: 5.4 % (ref 4–6)
HDLC SERPL-MCNC: 47 MG/DL
LDLC SERPL CALC-MCNC: 82 MG/DL (ref 0–130)
POTASSIUM SERPL-SCNC: 4.7 MMOL/L (ref 3.7–5.3)
PROT SERPL-MCNC: 7.2 G/DL (ref 6.4–8.3)
SODIUM SERPL-SCNC: 142 MMOL/L (ref 135–144)
TRIGL SERPL-MCNC: 183 MG/DL

## 2023-07-24 PROCEDURE — 80061 LIPID PANEL: CPT

## 2023-07-24 PROCEDURE — 80053 COMPREHEN METABOLIC PANEL: CPT

## 2023-07-24 PROCEDURE — 36415 COLL VENOUS BLD VENIPUNCTURE: CPT

## 2023-07-24 PROCEDURE — 83036 HEMOGLOBIN GLYCOSYLATED A1C: CPT

## 2024-02-19 ENCOUNTER — HOSPITAL ENCOUNTER (OUTPATIENT)
Dept: MAMMOGRAPHY | Age: 54
Discharge: HOME | End: 2024-02-21
Payer: COMMERCIAL

## 2024-02-19 ENCOUNTER — HOSPITAL ENCOUNTER (OUTPATIENT)
Dept: ULTRASOUND IMAGING | Age: 54
Discharge: HOME | End: 2024-02-21
Payer: COMMERCIAL

## 2024-02-19 VITALS — WEIGHT: 223 LBS | BODY MASS INDEX: 39.51 KG/M2 | HEIGHT: 63 IN

## 2024-02-19 DIAGNOSIS — Z12.31 ENCOUNTER FOR SCREENING MAMMOGRAM FOR MALIGNANT NEOPLASM OF BREAST: ICD-10-CM

## 2024-02-19 DIAGNOSIS — D36.9 BENIGN NEOPLASM: ICD-10-CM

## 2024-02-19 PROCEDURE — 77063 BREAST TOMOSYNTHESIS BI: CPT

## 2024-02-19 PROCEDURE — 76856 US EXAM PELVIC COMPLETE: CPT

## 2024-02-21 ENCOUNTER — HOSPITAL ENCOUNTER (OUTPATIENT)
Dept: ULTRASOUND IMAGING | Age: 54
Discharge: HOME | End: 2024-02-23
Payer: COMMERCIAL

## 2024-02-21 ENCOUNTER — HOSPITAL ENCOUNTER (OUTPATIENT)
Dept: MAMMOGRAPHY | Age: 54
Discharge: HOME | End: 2024-02-23
Payer: COMMERCIAL

## 2024-02-21 DIAGNOSIS — N64.53 RETRACTION OF NIPPLE: ICD-10-CM

## 2024-02-21 PROCEDURE — 76642 ULTRASOUND BREAST LIMITED: CPT

## 2024-02-21 PROCEDURE — G0279 TOMOSYNTHESIS, MAMMO: HCPCS

## 2024-03-05 ENCOUNTER — INITIAL CONSULT (OUTPATIENT)
Dept: SURGERY | Age: 54
End: 2024-03-05
Payer: COMMERCIAL

## 2024-03-05 VITALS
BODY MASS INDEX: 39.44 KG/M2 | OXYGEN SATURATION: 95 % | HEART RATE: 85 BPM | HEIGHT: 63 IN | WEIGHT: 222.6 LBS | DIASTOLIC BLOOD PRESSURE: 82 MMHG | SYSTOLIC BLOOD PRESSURE: 136 MMHG

## 2024-03-05 DIAGNOSIS — R92.8 ABNORMAL MAMMOGRAM OF RIGHT BREAST: Primary | ICD-10-CM

## 2024-03-05 PROCEDURE — G8427 DOCREV CUR MEDS BY ELIG CLIN: HCPCS | Performed by: SURGERY

## 2024-03-05 PROCEDURE — 3017F COLORECTAL CA SCREEN DOC REV: CPT | Performed by: SURGERY

## 2024-03-05 PROCEDURE — G8484 FLU IMMUNIZE NO ADMIN: HCPCS | Performed by: SURGERY

## 2024-03-05 PROCEDURE — G8417 CALC BMI ABV UP PARAM F/U: HCPCS | Performed by: SURGERY

## 2024-03-05 PROCEDURE — 99204 OFFICE O/P NEW MOD 45 MIN: CPT | Performed by: SURGERY

## 2024-03-05 PROCEDURE — 1036F TOBACCO NON-USER: CPT | Performed by: SURGERY

## 2024-03-05 RX ORDER — ACETAMINOPHEN 325 MG/1
650 TABLET ORAL EVERY 6 HOURS PRN
COMMUNITY

## 2024-03-05 NOTE — PROGRESS NOTES
Subjective   India Singh is a 53 y.o. female who presents today for further of a new finding on recent breast imaging.  Patient recently underwent mammogram which is showing a abnormality in the right  breast.   patient's last mammogram prior to this 1 was approximately 4 years ago with no findings at that time.  No family history of breast cancer.  Reports of family history of another type of cancer in a distant relative but no immediate relatives.  Patient has not noticed any changes in the breast recently.  Has not had any abnormal nipple drainage.    No past medical history on file.    Past Surgical History:   Procedure Laterality Date    CERVICAL POLYP REMOVAL      CHOLECYSTECTOMY      in [place]       Current Outpatient Medications   Medication Sig Dispense Refill    acetaminophen (TYLENOL) 325 MG tablet Take 2 tablets by mouth every 6 hours as needed for Pain       No current facility-administered medications for this visit.       No Known Allergies    Family History   Problem Relation Age of Onset    Cataracts Neg Hx     Diabetes Neg Hx     Glaucoma Neg Hx        Social History     Socioeconomic History    Marital status: Single     Spouse name: Not on file    Number of children: Not on file    Years of education: Not on file    Highest education level: Not on file   Occupational History    Not on file   Tobacco Use    Smoking status: Never    Smokeless tobacco: Never   Substance and Sexual Activity    Alcohol use: Never    Drug use: Never    Sexual activity: Not on file   Other Topics Concern    Not on file   Social History Narrative    Not on file     Social Determinants of Health     Financial Resource Strain: Low Risk  (8/2/2022)    Overall Financial Resource Strain (CARDIA)     Difficulty of Paying Living Expenses: Not hard at all   Food Insecurity: No Food Insecurity (8/2/2022)    Hunger Vital Sign     Worried About Running Out of Food in the Last Year: Never true     Ran Out of Food in

## 2024-03-05 NOTE — PROGRESS NOTES
Breast Evaluation Work Sheet    3/5/2024    Patient:India Singh               :1970    Race:     Age of Menarche: 15 y.oo or 16 y.o     Age of 1st live Birth (zero if no live births):17 y.o     Number of Pregnancies: 3  Number of live births: 3    OCP: No    Hormone Replacement Therapy: No     LMP: last summer around 2023    Number of previous breast biopsies: No  Any with atypical pathology N/A    History DCIS or LCIS: No    Personal History of other Cancers: No      Family History Breast Cancer and Age of onset:    Mother No               Sister(s) No    Maternal GM No      Daughter(s) No    Paternal GM No       Other(s) No    History of other breast problems: No     Nipple Drainage: No      Spontaneous: No   Cyst(s): No    Pain: No     Skin Changes: No

## 2024-03-05 NOTE — ASSESSMENT & PLAN NOTE
Patient does have a new abnormal finding on mammogram in the right breast which was confirmed on ultrasound.  No prior imaging to compare to as her last mammogram was in [place] and was approximately 5 years ago.  No clinical symptoms of significance.  This was BI-RADS 4 and will plan for image guided needle biopsy.  Will plan for follow-up results once available and further recommendations at that time.  Plan to see the patient back in next week for discussion of the results and for further planning as appropriate.

## 2024-03-06 ENCOUNTER — HOSPITAL ENCOUNTER (OUTPATIENT)
Dept: MAMMOGRAPHY | Age: 54
Discharge: HOME | End: 2024-03-08
Payer: COMMERCIAL

## 2024-03-06 ENCOUNTER — HOSPITAL ENCOUNTER (OUTPATIENT)
Age: 54
Setting detail: SPECIMEN
Discharge: HOME | End: 2024-03-06
Payer: COMMERCIAL

## 2024-03-06 ENCOUNTER — HOSPITAL ENCOUNTER (OUTPATIENT)
Dept: ULTRASOUND IMAGING | Age: 54
Discharge: HOME | End: 2024-03-08
Payer: COMMERCIAL

## 2024-03-06 DIAGNOSIS — R92.8 ABNORMAL MAMMOGRAM OF RIGHT BREAST: ICD-10-CM

## 2024-03-06 PROCEDURE — 2720000010 US BREAST BIOPSY W LOC DEVICE 1ST LESION RIGHT

## 2024-03-06 PROCEDURE — 77065 DX MAMMO INCL CAD UNI: CPT

## 2024-03-06 PROCEDURE — 88305 TISSUE EXAM BY PATHOLOGIST: CPT

## 2024-03-06 PROCEDURE — 2500000003 HC RX 250 WO HCPCS

## 2024-03-08 LAB — SURGICAL PATHOLOGY REPORT: NORMAL

## 2024-03-14 ENCOUNTER — OFFICE VISIT (OUTPATIENT)
Dept: SURGERY | Age: 54
End: 2024-03-14
Payer: COMMERCIAL

## 2024-03-14 VITALS
HEART RATE: 68 BPM | SYSTOLIC BLOOD PRESSURE: 116 MMHG | WEIGHT: 223 LBS | DIASTOLIC BLOOD PRESSURE: 82 MMHG | BODY MASS INDEX: 39.51 KG/M2 | HEIGHT: 63 IN

## 2024-03-14 DIAGNOSIS — R92.8 ABNORMAL MAMMOGRAM OF RIGHT BREAST: Primary | ICD-10-CM

## 2024-03-14 PROCEDURE — G8427 DOCREV CUR MEDS BY ELIG CLIN: HCPCS | Performed by: SURGERY

## 2024-03-14 PROCEDURE — 99213 OFFICE O/P EST LOW 20 MIN: CPT | Performed by: SURGERY

## 2024-03-14 PROCEDURE — G8484 FLU IMMUNIZE NO ADMIN: HCPCS | Performed by: SURGERY

## 2024-03-14 PROCEDURE — 1036F TOBACCO NON-USER: CPT | Performed by: SURGERY

## 2024-03-14 PROCEDURE — G8417 CALC BMI ABV UP PARAM F/U: HCPCS | Performed by: SURGERY

## 2024-03-14 PROCEDURE — 3017F COLORECTAL CA SCREEN DOC REV: CPT | Performed by: SURGERY

## 2024-03-14 NOTE — PROGRESS NOTES
Subjective   India Singh is a 53 y.o. female who presents today for follow-up of recent breast biopsy.  Patient comes in today with daughters.  Does report some mild pain in the breast and some mild bruising but seems to be improving already.  No other complaints today.    No past medical history on file.    Past Surgical History:   Procedure Laterality Date    CERVICAL POLYP REMOVAL      CHOLECYSTECTOMY N/A     in [place]    US BREAST BIOPSY W LOC DEVICE 1ST LESION RIGHT Right 3/6/2024    US BREAST BIOPSY W LOC DEVICE 1ST LESION RIGHT 3/6/2024 [facility] ULTRASOUND       Current Outpatient Medications   Medication Sig Dispense Refill    acetaminophen (TYLENOL) 325 MG tablet Take 2 tablets by mouth every 6 hours as needed for Pain       No current facility-administered medications for this visit.       No Known Allergies    Family History   Problem Relation Age of Onset    Cataracts Neg Hx     Diabetes Neg Hx     Glaucoma Neg Hx        Social History     Socioeconomic History    Marital status: Single     Spouse name: Not on file    Number of children: Not on file    Years of education: Not on file    Highest education level: Not on file   Occupational History    Not on file   Tobacco Use    Smoking status: Never    Smokeless tobacco: Never   Substance and Sexual Activity    Alcohol use: Never    Drug use: Never    Sexual activity: Not on file   Other Topics Concern    Not on file   Social History Narrative    Not on file     Social Determinants of Health     Financial Resource Strain: Low Risk  (8/2/2022)    Overall Financial Resource Strain (CARDIA)     Difficulty of Paying Living Expenses: Not hard at all   Food Insecurity: No Food Insecurity (8/2/2022)    Hunger Vital Sign     Worried About Running Out of Food in the Last Year: Never true     Ran Out of Food in the Last Year: Never true   Transportation Needs: No Transportation Needs (1/9/2020)    PRAPARE - Transportation     Lack of Transportation

## 2024-03-14 NOTE — ASSESSMENT & PLAN NOTE
Discussed the pathology findings with the patient today and she voiced understanding.  Pathology is showing benign lipoma.  On my review of the imaging this does seem to be concordant and especially the ultrasound imaging is consistent in appearance with lipoma.    I have reached out to radiology to have them confirm concordance.  Seems that they wants the initial reading radiologist who also performed a biopsy to give the final determination.  He is out of town this week so we will await results once he has a chance to review that and then call the patient with those results.  Otherwise if they confirm concordance with no further follow-up or interventions would be needed.

## 2024-04-03 DIAGNOSIS — R92.8 ABNORMAL MAMMOGRAM OF RIGHT BREAST: Primary | ICD-10-CM

## 2024-07-24 ENCOUNTER — HOSPITAL ENCOUNTER (OUTPATIENT)
Dept: GENERAL RADIOLOGY | Age: 54
Discharge: HOME | End: 2024-07-26
Payer: MEDICAID

## 2024-07-24 DIAGNOSIS — M54.50 LOW BACK PAIN, UNSPECIFIED BACK PAIN LATERALITY, UNSPECIFIED CHRONICITY, UNSPECIFIED WHETHER SCIATICA PRESENT: ICD-10-CM

## 2024-07-24 PROCEDURE — 72220 X-RAY EXAM SACRUM TAILBONE: CPT

## 2024-07-24 PROCEDURE — 72120 X-RAY BEND ONLY L-S SPINE: CPT

## 2024-09-11 ENCOUNTER — HOSPITAL ENCOUNTER (OUTPATIENT)
Dept: MAMMOGRAPHY | Age: 54
Discharge: HOME | End: 2024-09-13
Payer: MEDICAID

## 2024-09-11 ENCOUNTER — HOSPITAL ENCOUNTER (OUTPATIENT)
Dept: ULTRASOUND IMAGING | Age: 54
Discharge: HOME | End: 2024-09-13
Payer: MEDICAID

## 2024-09-11 DIAGNOSIS — R92.8 ABNORMAL MAMMOGRAM OF RIGHT BREAST: ICD-10-CM

## 2024-09-11 PROCEDURE — G0279 TOMOSYNTHESIS, MAMMO: HCPCS

## 2024-09-11 PROCEDURE — 76642 ULTRASOUND BREAST LIMITED: CPT

## 2024-09-13 DIAGNOSIS — R92.8 ABNORMAL MAMMOGRAM OF RIGHT BREAST: Primary | ICD-10-CM

## 2025-02-13 PROBLEM — E66.9 OBESITY: Status: ACTIVE | Noted: 2024-07-12

## 2025-02-13 PROBLEM — D36.9 BENIGN NEOPLASM: Status: ACTIVE | Noted: 2024-05-07

## 2025-02-13 PROBLEM — K05.319: Status: ACTIVE | Noted: 2024-07-12

## 2025-02-21 ENCOUNTER — OFFICE VISIT (OUTPATIENT)
Dept: BARIATRICS/WEIGHT MGMT | Age: 55
End: 2025-02-21

## 2025-02-21 VITALS
HEART RATE: 71 BPM | SYSTOLIC BLOOD PRESSURE: 124 MMHG | DIASTOLIC BLOOD PRESSURE: 88 MMHG | HEIGHT: 63 IN | WEIGHT: 214 LBS | BODY MASS INDEX: 37.92 KG/M2 | TEMPERATURE: 97.2 F

## 2025-02-21 DIAGNOSIS — E28.2 POLYCYSTIC OVARIAN SYNDROME: ICD-10-CM

## 2025-02-21 DIAGNOSIS — N20.0 NEPHROLITHIASIS: ICD-10-CM

## 2025-02-21 DIAGNOSIS — G89.29 CHRONIC LOW BACK PAIN, UNSPECIFIED BACK PAIN LATERALITY, UNSPECIFIED WHETHER SCIATICA PRESENT: ICD-10-CM

## 2025-02-21 DIAGNOSIS — M54.50 CHRONIC LOW BACK PAIN, UNSPECIFIED BACK PAIN LATERALITY, UNSPECIFIED WHETHER SCIATICA PRESENT: ICD-10-CM

## 2025-02-21 DIAGNOSIS — Z91.89 AT RISK FOR OBSTRUCTIVE SLEEP APNEA: ICD-10-CM

## 2025-02-21 RX ORDER — METFORMIN HYDROCHLORIDE 500 MG/1
TABLET, EXTENDED RELEASE ORAL
COMMUNITY
Start: 2025-01-27

## 2025-02-21 RX ORDER — MAGNESIUM OXIDE 400 MG/1
1 TABLET ORAL NIGHTLY
COMMUNITY
Start: 2025-01-28

## 2025-02-21 RX ORDER — MAGNESIUM GLUCONATE 27 MG(500)
TABLET ORAL
COMMUNITY
Start: 2025-01-28

## 2025-02-21 ASSESSMENT — ENCOUNTER SYMPTOMS
BACK PAIN: 1
RECTAL PAIN: 0
EYE PAIN: 0
ALLERGIC/IMMUNOLOGIC NEGATIVE: 1
SHORTNESS OF BREATH: 0
STRIDOR: 0
SORE THROAT: 0
VOICE CHANGE: 0
RHINORRHEA: 0
ANAL BLEEDING: 0
PHOTOPHOBIA: 0
NAUSEA: 0
CHOKING: 0
ABDOMINAL PAIN: 0
VOMITING: 0
EYE REDNESS: 0
FACIAL SWELLING: 0
CONSTIPATION: 0
COLOR CHANGE: 0
BLOOD IN STOOL: 0
WHEEZING: 0
EYE ITCHING: 0
CHEST TIGHTNESS: 0
COUGH: 0
EYE DISCHARGE: 0
APNEA: 0
DIARRHEA: 0
SINUS PRESSURE: 0
ABDOMINAL DISTENTION: 0
TROUBLE SWALLOWING: 0

## 2025-02-21 NOTE — PROGRESS NOTES
well-developed. She is not diaphoretic.   HENT:      Head: Normocephalic and atraumatic.      Right Ear: External ear normal.      Left Ear: External ear normal.      Nose: Nose normal.   Eyes:      General: No scleral icterus.        Right eye: No discharge.         Left eye: No discharge.      Conjunctiva/sclera: Conjunctivae normal.   Cardiovascular:      Rate and Rhythm: Normal rate and regular rhythm.      Heart sounds: Normal heart sounds.   Pulmonary:      Effort: Pulmonary effort is normal. No respiratory distress.      Breath sounds: Normal breath sounds. No wheezing or rales.   Chest:      Chest wall: No tenderness.   Abdominal:      General: Bowel sounds are normal. There is no distension.      Palpations: Abdomen is soft. There is no mass.      Tenderness: There is no abdominal tenderness. There is no guarding or rebound.   Musculoskeletal:         General: No tenderness. Normal range of motion.      Cervical back: Normal range of motion and neck supple.   Skin:     General: Skin is warm and dry.      Coloration: Skin is not pale.      Findings: No erythema or rash.   Neurological:      Mental Status: She is alert and oriented to person, place, and time.      Cranial Nerves: No cranial nerve deficit.   Psychiatric:         Behavior: Behavior normal.         Thought Content: Thought content normal.         Judgment: Judgment normal.       Lab Results   Component Value Date    WBC 6.7 04/05/2023    HGB 13.8 04/05/2023    HCT 41.3 04/05/2023    MCV 91.2 04/05/2023     04/05/2023     Lab Results   Component Value Date     07/24/2023    K 4.7 07/24/2023     07/24/2023    CO2 28 07/24/2023     Lab Results   Component Value Date    CREATININE 0.6 07/24/2023     Lab Results   Component Value Date    ALT 17 07/24/2023    AST 16 07/24/2023    ALKPHOS 111 (H) 07/24/2023    BILITOT 0.3 07/24/2023     No results found for: \"LIPASE\"    Patient Active Problem List   Diagnosis    Abnormal mammogram of

## 2025-03-02 ENCOUNTER — HOSPITAL ENCOUNTER (EMERGENCY)
Age: 55
Discharge: HOME | End: 2025-03-02
Attending: EMERGENCY MEDICINE
Payer: MEDICAID

## 2025-03-02 ENCOUNTER — APPOINTMENT (OUTPATIENT)
Dept: GENERAL RADIOLOGY | Age: 55
End: 2025-03-02
Attending: EMERGENCY MEDICINE
Payer: MEDICAID

## 2025-03-02 VITALS
WEIGHT: 214 LBS | RESPIRATION RATE: 15 BRPM | HEIGHT: 62 IN | DIASTOLIC BLOOD PRESSURE: 104 MMHG | BODY MASS INDEX: 39.38 KG/M2 | HEART RATE: 80 BPM | TEMPERATURE: 98.2 F | OXYGEN SATURATION: 98 % | SYSTOLIC BLOOD PRESSURE: 136 MMHG

## 2025-03-02 DIAGNOSIS — S46.912A STRAIN OF LEFT SHOULDER, INITIAL ENCOUNTER: Primary | ICD-10-CM

## 2025-03-02 PROCEDURE — 73030 X-RAY EXAM OF SHOULDER: CPT

## 2025-03-02 PROCEDURE — 99283 EMERGENCY DEPT VISIT LOW MDM: CPT

## 2025-03-02 RX ORDER — HYDROCODONE BITARTRATE AND ACETAMINOPHEN 5; 325 MG/1; MG/1
1 TABLET ORAL EVERY 6 HOURS PRN
Qty: 12 TABLET | Refills: 0 | Status: SHIPPED | OUTPATIENT
Start: 2025-03-02 | End: 2025-03-05

## 2025-03-02 ASSESSMENT — PAIN DESCRIPTION - PAIN TYPE: TYPE: ACUTE PAIN

## 2025-03-02 ASSESSMENT — PAIN - FUNCTIONAL ASSESSMENT
PAIN_FUNCTIONAL_ASSESSMENT: 0-10
PAIN_FUNCTIONAL_ASSESSMENT: PREVENTS OR INTERFERES SOME ACTIVE ACTIVITIES AND ADLS

## 2025-03-02 ASSESSMENT — PAIN DESCRIPTION - FREQUENCY: FREQUENCY: INTERMITTENT

## 2025-03-02 ASSESSMENT — PAIN DESCRIPTION - ONSET: ONSET: PROGRESSIVE

## 2025-03-02 ASSESSMENT — PAIN DESCRIPTION - DESCRIPTORS: DESCRIPTORS: DISCOMFORT;SORE

## 2025-03-02 ASSESSMENT — PAIN SCALES - GENERAL: PAINLEVEL_OUTOF10: 9

## 2025-03-02 ASSESSMENT — PAIN DESCRIPTION - ORIENTATION: ORIENTATION: LEFT

## 2025-03-02 ASSESSMENT — PAIN DESCRIPTION - LOCATION: LOCATION: SHOULDER

## 2025-03-02 NOTE — ED PROVIDER NOTES
[facility]  EMERGENCY DEPARTMENT ENCOUNTER      Pt Name: India Singh  MRN: [id number]  Birthdate 1970  Date of evaluation: 3/2/2025      CHIEF COMPLAINT       Chief Complaint   Patient presents with    Shoulder Pain     Left shoulder pain x a couple days. Denies injury.         HISTORY OF PRESENT ILLNESS      The patient presents with left shoulder pain that has been going on for several days.  She does not recall an injury but she does work at a [employer].  She does not do heavy lifting but she does move her arms around.  She is right-handed.  She is a diabetic.  She denies chest pain per se.  She says the pain has gone up to a 9 out of 10.  She did take some Tylenol prior to arrival.  She denies numbness or tingling.  She denies neck pain.  She locates the pain in the shoulder on the deltoid area, worse with moving her arm backwards or trying to lift it over her head.  She denies weakness or numbness.    We spoke with the patient using the Dutch language translating service and I also spoke with her in Dutch.  She has limited English.      REVIEW OF SYSTEMS       Left shoulder pain as noted in HPI.  Denies chest pain or shortness of breath.    PAST MEDICAL HISTORY    has a past medical history of Kidney stone and Polycystic ovary.    SURGICAL HISTORY      has a past surgical history that includes Cervical polyp removal; Cholecystectomy (N/A); and US BREAST BIOPSY W LOC DEVICE 1ST LESION RIGHT (Right, 3/6/2024).    CURRENT MEDICATIONS       Previous Medications    ACETAMINOPHEN (TYLENOL) 325 MG TABLET    Take 2 tablets by mouth every 6 hours as needed for Pain    MAGNESIUM OXIDE (MAG-OX) 400 MG TABLET    Take 1 tablet by mouth nightly    METFORMIN (GLUCOPHAGE-XR) 500 MG EXTENDED RELEASE TABLET    TAKE 2 TABLETS BY MOUTH ONCE DAILY FOR PREDIABETES    OMEGA-3 FATTY ACIDS (OMEGA III EPA+DHA) 1000 MG CAPS    TAKE TWO CAPSULES BY MOUTH TWICE DAILY       ALLERGIES     has No Known

## 2025-03-02 NOTE — DISCHARGE INSTRUCTIONS
Activity as tolerated.  May take Norco or ibuprofen as directed.  See your doctor to follow-up.  Return for worsening pain, weakness, numbness, or if worse in any way.    Please understand that at this time there is no evidence for a more serious underlying process, but that early in the process of an illness or injury, an emergency department workup can be falsely reassuring.  You should contact your family doctor within the next 48 hours for a follow up appointment    THANK YOU!!!    From [facility] and [facility]    On behalf of the Emergency Department staff at [facility], I would like to thank you for giving us the opportunity to address your health care needs and concerns.    We hope that during your visit, our service was delivered in a professional and caring manner. Please keep [facility] in mind as we walk with you down the path to your own personal wellness.     Please expect an automated text message or email from us so we can ask a few questions about your health and progress. Based on your answers, a clinician may call you back to offer help and instructions.    Please understand that early in the process of an illness or injury, an emergency department workup can be falsely reassuring.  If you notice any worsening, changing or persistent symptoms please call your family doctor or return to the ER immediately.     Tell us how we did during your visit at [email or web address]   and let us know about your experience

## 2025-03-04 NOTE — PROGRESS NOTES
reviewed with patient to achieve over 6 month period prior to insurance submission.  Initial weight was: 214 lbs   3-5% Weight Loss goal will be minimum of: 6-11 lbs weight loss.     Plan/Recommendations:    Educational handouts provided and reviewed with patient as follows: Online Support Groups, My Plate Picture Method, Portion Size Guide, Food Journal    -  Patient Instructions   Goals:  1. I will get the lab work done that is mailed to my mailing address before next office visit.  You will need to fast 10-12 hours for this (no food or drink besides water).  2  I will aim for at least 64 oz of water each day (= 8 cups per day or four bottled ramey)  3.  I will use my food journal to record meal times, serving sizes and bring back to next dietitian visit.  4   I will increase my physical activity by using Elliptical bike at home four days a week at least 20 minutes  5.  Initial weight loss goal encouraged of 3-5% is recommended over 6 month period.  This is a minimum of: 6-11 lbs from your weight when initially met with physician of: 214 lbs.     6.  Call Pulmonary Department to set up an \"RASHEEDA eval\" appointment [phone]    -Followup visit: 4 weeks with dietitian and PA       Renetta Mena RD, LD   Dietitian- [employer]  [facility]

## 2025-03-05 ENCOUNTER — OFFICE VISIT (OUTPATIENT)
Dept: BARIATRICS/WEIGHT MGMT | Age: 55
End: 2025-03-05

## 2025-03-05 VITALS — HEIGHT: 63 IN | BODY MASS INDEX: 38.02 KG/M2 | WEIGHT: 214.6 LBS

## 2025-03-05 DIAGNOSIS — Z13.21 MALNUTRITION SCREEN: ICD-10-CM

## 2025-03-05 DIAGNOSIS — Z01.818 PRE-OP TESTING: ICD-10-CM

## 2025-03-05 NOTE — PATIENT INSTRUCTIONS
Goals:  1. I will get the lab work done that is mailed to my mailing address before next office visit.  You will need to fast 10-12 hours for this (no food or drink besides water).  2  I will aim for at least 64 oz of water each day (= 8 cups per day or four bottled ramey)  3.  I will use my food journal to record meal times, serving sizes and bring back to next dietitian visit.  4   I will increase my physical activity by using Elliptical bike at home four days a week at least 20 minutes  5.  Initial weight loss goal encouraged of 3-5% is recommended over 6 month period.  This is a minimum of: 6-11 lbs from your weight when initially met with physician of: 214 lbs.     6.  Call Pulmonary Department to set up an \"RASHEEDA eval\" appointment [phone]

## 2025-03-10 ENCOUNTER — TRANSCRIBE ORDERS (OUTPATIENT)
Dept: ADMINISTRATIVE | Age: 55
End: 2025-03-10

## 2025-03-10 DIAGNOSIS — D36.9 BENIGN NEOPLASM: Primary | ICD-10-CM

## 2025-03-14 ENCOUNTER — HOSPITAL ENCOUNTER (OUTPATIENT)
Dept: ULTRASOUND IMAGING | Age: 55
Discharge: HOME | End: 2025-03-16
Payer: MEDICAID

## 2025-03-14 ENCOUNTER — HOSPITAL ENCOUNTER (OUTPATIENT)
Dept: MAMMOGRAPHY | Age: 55
Discharge: HOME | End: 2025-03-16
Payer: MEDICAID

## 2025-03-14 VITALS — HEIGHT: 63 IN | BODY MASS INDEX: 37.21 KG/M2 | WEIGHT: 210 LBS

## 2025-03-14 DIAGNOSIS — D36.9 BENIGN NEOPLASM: ICD-10-CM

## 2025-03-14 DIAGNOSIS — R92.8 ABNORMAL MAMMOGRAM OF RIGHT BREAST: ICD-10-CM

## 2025-03-14 PROCEDURE — 76642 ULTRASOUND BREAST LIMITED: CPT

## 2025-03-14 PROCEDURE — G0279 TOMOSYNTHESIS, MAMMO: HCPCS

## 2025-03-18 ENCOUNTER — OFFICE VISIT (OUTPATIENT)
Dept: SURGERY | Age: 55
End: 2025-03-18
Payer: MEDICAID

## 2025-03-18 VITALS
WEIGHT: 214 LBS | BODY MASS INDEX: 37.92 KG/M2 | HEART RATE: 66 BPM | HEIGHT: 63 IN | SYSTOLIC BLOOD PRESSURE: 134 MMHG | OXYGEN SATURATION: 93 % | DIASTOLIC BLOOD PRESSURE: 86 MMHG

## 2025-03-18 DIAGNOSIS — R92.8 ABNORMAL FINDINGS ON DIAGNOSTIC IMAGING OF BREAST: Primary | ICD-10-CM

## 2025-03-18 PROCEDURE — G8417 CALC BMI ABV UP PARAM F/U: HCPCS | Performed by: SURGERY

## 2025-03-18 PROCEDURE — 99214 OFFICE O/P EST MOD 30 MIN: CPT | Performed by: SURGERY

## 2025-03-18 PROCEDURE — 1036F TOBACCO NON-USER: CPT | Performed by: SURGERY

## 2025-03-18 PROCEDURE — G8427 DOCREV CUR MEDS BY ELIG CLIN: HCPCS | Performed by: SURGERY

## 2025-03-18 PROCEDURE — 3017F COLORECTAL CA SCREEN DOC REV: CPT | Performed by: SURGERY

## 2025-03-18 NOTE — PROGRESS NOTES
Breast Evaluation Work Sheet    3/18/2025    Patient:India Singh               :1970    Race:     Age of Menarche: unsure 12-13 y.o     Age of 1st live Birth (zero if no live births):17 y.o     Number of Pregnancies: 3  Number of live births: 2    History of Breastfeeding: yes little     OCP's (use of oral contraceptives) : none     Last Menstrual Period: last summer     HRT (hormone replacement therapy) : No     Number of previous breast biopsies: 1  3/6/24    Any with atypical pathology No, Lipoma Right Breast     History DCIS or LCIS: No    Personal History of other Cancers: No      Ovarian: No  Endometrial: No    Family History Breast Cancer & Age of Onset:    Mother No               Sister(s) No    Maternal GM No      Daughter(s) No    Paternal GM No       Other(s) No    History of other breast problems: None     Nipple Drainage: No    Cyst(s): No    Pain: N/A    Skin Changes: None      
nourished  Eyes: No gross abnormalities.  Ears, Nose, Throat: hearing grossly normal bilaterally  Neck: neck supple and non tender without mass  Lungs: clear to auscultation without wheezes or rales   Heart: S1S2, no mumurs, RRR  Abdomen: soft, nontender, no HSM, no guarding, no rebound, no masses  Extremity: negative  Neuro: CN II-XII grossly intact          1. Abnormal findings on diagnostic imaging of breast  Assessment & Plan:  The patient recently had repeat imaging that is not showing any further findings at the area of concern previously but is now showing a new area of concern in the upper outer quadrant of the same breast.  Read as BI-RADS 4.  Have recommended image guided needle biopsy to further evaluate.  Will plan to follow-up results once available.  Will tentatively plan for office appointment to discuss results.  If these are benign there depending on the pathology there may not be any further interventions that are required and if so it is not required that she would come back to the office to discuss that.         (Please note that portions of this note were completed with a voice recognition program.  Efforts were made to edit the dictations but occasionally words are mis-transcribed.)

## 2025-03-18 NOTE — ASSESSMENT & PLAN NOTE
The patient recently had repeat imaging that is not showing any further findings at the area of concern previously but is now showing a new area of concern in the upper outer quadrant of the same breast.  Read as BI-RADS 4.  Have recommended image guided needle biopsy to further evaluate.  Will plan to follow-up results once available.  Will tentatively plan for office appointment to discuss results.  If these are benign there depending on the pathology there may not be any further interventions that are required and if so it is not required that she would come back to the office to discuss that.

## 2025-03-19 ENCOUNTER — HOSPITAL ENCOUNTER (OUTPATIENT)
Dept: MAMMOGRAPHY | Age: 55
Discharge: HOME | End: 2025-03-21
Payer: MEDICAID

## 2025-03-19 ENCOUNTER — HOSPITAL ENCOUNTER (OUTPATIENT)
Age: 55
Setting detail: SPECIMEN
Discharge: HOME | End: 2025-03-19

## 2025-03-19 DIAGNOSIS — R92.8 ABNORMAL FINDINGS ON DIAGNOSTIC IMAGING OF BREAST: ICD-10-CM

## 2025-03-19 PROCEDURE — 6360000002 HC RX W HCPCS

## 2025-03-19 PROCEDURE — 2720000010 MAM STEREO BREAST BX W LOC DEVICE 1ST LESION RIGHT

## 2025-03-24 LAB — SURGICAL PATHOLOGY REPORT: NORMAL

## 2025-03-25 ENCOUNTER — RESULTS FOLLOW-UP (OUTPATIENT)
Dept: SURGERY | Age: 55
End: 2025-03-25

## 2025-03-27 ENCOUNTER — HOSPITAL ENCOUNTER (OUTPATIENT)
Age: 55
Discharge: HOME | End: 2025-03-27
Payer: MEDICAID

## 2025-03-27 DIAGNOSIS — Z13.21 MALNUTRITION SCREEN: ICD-10-CM

## 2025-03-27 DIAGNOSIS — Z01.818 PRE-OP TESTING: ICD-10-CM

## 2025-03-27 LAB
25(OH)D3 SERPL-MCNC: 27.1 NG/ML (ref 30–100)
ALBUMIN SERPL-MCNC: 4.1 G/DL (ref 3.5–5.2)
ALBUMIN/GLOB SERPL: 1.4 {RATIO} (ref 1–2.5)
ALP SERPL-CCNC: 114 U/L (ref 35–104)
ALT SERPL-CCNC: 17 U/L (ref 5–33)
AMPHET UR QL SCN: NEGATIVE
ANION GAP SERPL CALCULATED.3IONS-SCNC: 11 MMOL/L (ref 9–17)
AST SERPL-CCNC: 16 U/L
BARBITURATES UR QL SCN: NEGATIVE
BASOPHILS # BLD: 0.05 K/UL (ref 0–0.2)
BASOPHILS NFR BLD: 1 % (ref 0–2)
BENZODIAZ UR QL: NEGATIVE
BILIRUB SERPL-MCNC: 0.2 MG/DL (ref 0.3–1.2)
BUN SERPL-MCNC: 14 MG/DL (ref 6–20)
BUN/CREAT SERPL: 23 (ref 9–20)
CALCIUM SERPL-MCNC: 9.4 MG/DL (ref 8.6–10.4)
CANNABINOIDS UR QL SCN: NEGATIVE
CHLORIDE SERPL-SCNC: 104 MMOL/L (ref 98–107)
CHOLEST SERPL-MCNC: 230 MG/DL (ref 0–199)
CHOLESTEROL/HDL RATIO: 4.8
CO2 SERPL-SCNC: 23 MMOL/L (ref 20–31)
COCAINE UR QL SCN: NEGATIVE
CREAT SERPL-MCNC: 0.6 MG/DL (ref 0.5–0.9)
EOSINOPHIL # BLD: 0.2 K/UL (ref 0–0.44)
EOSINOPHILS RELATIVE PERCENT: 4 % (ref 1–4)
ERYTHROCYTE [DISTWIDTH] IN BLOOD BY AUTOMATED COUNT: 14.2 % (ref 11.8–14.4)
EST. AVERAGE GLUCOSE BLD GHB EST-MCNC: 114 MG/DL
FENTANYL UR QL: NEGATIVE
FERRITIN SERPL-MCNC: 103 NG/ML (ref 15–150)
FOLATE SERPL-MCNC: 9.9 NG/ML (ref 4.8–24.2)
GFR, ESTIMATED: >90 ML/MIN/1.73M2
GLUCOSE SERPL-MCNC: 108 MG/DL (ref 70–99)
HBA1C MFR BLD: 5.6 % (ref 4–6)
HCT VFR BLD AUTO: 45.9 % (ref 36.3–47.1)
HDLC SERPL-MCNC: 48 MG/DL
HGB BLD-MCNC: 14.6 G/DL (ref 11.9–15.1)
IMM GRANULOCYTES # BLD AUTO: 0.04 K/UL (ref 0–0.3)
IMM GRANULOCYTES NFR BLD: 1 %
INR PPP: 1
IRON SATN MFR SERPL: 20 % (ref 20–55)
IRON SERPL-MCNC: 57 UG/DL (ref 37–145)
LDLC SERPL CALC-MCNC: ABNORMAL MG/DL (ref 0–100)
LDLC SERPL DIRECT ASSAY-MCNC: 91 MG/DL
LYMPHOCYTES NFR BLD: 1.94 K/UL (ref 1.1–3.7)
LYMPHOCYTES RELATIVE PERCENT: 34 % (ref 24–43)
MCH RBC QN AUTO: 29 PG (ref 25.2–33.5)
MCHC RBC AUTO-ENTMCNC: 31.8 G/DL (ref 25.2–33.5)
MCV RBC AUTO: 91.3 FL (ref 82.6–102.9)
METHADONE UR QL: NEGATIVE
MONOCYTES NFR BLD: 0.41 K/UL (ref 0.1–1.2)
MONOCYTES NFR BLD: 7 % (ref 3–12)
NEUTROPHILS NFR BLD: 53 % (ref 36–65)
NEUTS SEG NFR BLD: 3.02 K/UL (ref 1.5–8.1)
NRBC BLD-RTO: 0 PER 100 WBC
OPIATES UR QL SCN: NEGATIVE
OXYCODONE UR QL SCN: NEGATIVE
PCP UR QL SCN: NEGATIVE
PLATELET # BLD AUTO: 285 K/UL (ref 138–453)
PMV BLD AUTO: 9.9 FL (ref 8.1–13.5)
POTASSIUM SERPL-SCNC: 4.2 MMOL/L (ref 3.7–5.3)
PROT SERPL-MCNC: 7 G/DL (ref 6.4–8.3)
PROTHROMBIN TIME: 13.2 SEC (ref 11.5–14.2)
PTH-INTACT SERPL-MCNC: 36 PG/ML (ref 17.9–58.6)
RBC # BLD AUTO: 5.03 M/UL (ref 3.95–5.11)
SODIUM SERPL-SCNC: 138 MMOL/L (ref 135–144)
TEST INFORMATION: NORMAL
TIBC SERPL-MCNC: 288 UG/DL (ref 250–450)
TRIGL SERPL-MCNC: 434 MG/DL
TSH SERPL DL<=0.05 MIU/L-ACNC: 4.57 UIU/ML (ref 0.3–5)
UNSATURATED IRON BINDING CAPACITY: 231 UG/DL (ref 112–347)
VIT B12 SERPL-MCNC: 784 PG/ML (ref 232–1245)
VLDLC SERPL CALC-MCNC: ABNORMAL MG/DL (ref 1–30)
WBC OTHER # BLD: 5.7 K/UL (ref 3.5–11.3)

## 2025-03-27 PROCEDURE — 80307 DRUG TEST PRSMV CHEM ANLYZR: CPT

## 2025-03-27 PROCEDURE — 85610 PROTHROMBIN TIME: CPT

## 2025-03-27 PROCEDURE — 85025 COMPLETE CBC W/AUTO DIFF WBC: CPT

## 2025-03-27 PROCEDURE — 83550 IRON BINDING TEST: CPT

## 2025-03-27 PROCEDURE — 80061 LIPID PANEL: CPT

## 2025-03-27 PROCEDURE — 84443 ASSAY THYROID STIM HORMONE: CPT

## 2025-03-27 PROCEDURE — G0480 DRUG TEST DEF 1-7 CLASSES: HCPCS

## 2025-03-27 PROCEDURE — 82306 VITAMIN D 25 HYDROXY: CPT

## 2025-03-27 PROCEDURE — 82728 ASSAY OF FERRITIN: CPT

## 2025-03-27 PROCEDURE — 84425 ASSAY OF VITAMIN B-1: CPT

## 2025-03-27 PROCEDURE — 82746 ASSAY OF FOLIC ACID SERUM: CPT

## 2025-03-27 PROCEDURE — 82607 VITAMIN B-12: CPT

## 2025-03-27 PROCEDURE — 83970 ASSAY OF PARATHORMONE: CPT

## 2025-03-27 PROCEDURE — 80053 COMPREHEN METABOLIC PANEL: CPT

## 2025-03-27 PROCEDURE — 84590 ASSAY OF VITAMIN A: CPT

## 2025-03-27 PROCEDURE — 83721 ASSAY OF BLOOD LIPOPROTEIN: CPT

## 2025-03-27 PROCEDURE — 83540 ASSAY OF IRON: CPT

## 2025-03-27 PROCEDURE — 83036 HEMOGLOBIN GLYCOSYLATED A1C: CPT

## 2025-03-27 PROCEDURE — 36415 COLL VENOUS BLD VENIPUNCTURE: CPT

## 2025-03-30 LAB
COTININE: <5 NG/ML
NICOTINE: <5 NG/ML

## 2025-03-31 LAB
RETINYL PALMITATE: 0.08 MG/L (ref 0–0.1)
VIT B1 PYROPHOSHATE BLD-SCNC: 170 NMOL/L (ref 70–180)
VITAMIN A LEVEL: 0.58 MG/L (ref 0.3–1.2)
VITAMIN A, INTERP: NORMAL

## 2025-04-08 NOTE — PROGRESS NOTES
needed prior to surgery prn   Psychology evaluation with Dr. Simons 4/10 and 5/8  EGD prior to any surgical intervention.  Encouraged to attend support groups. Has completed x 2.   Seca scale next month  Initial labs completed and reviewed   Triglycerides 434/ cholesterol 230 unable to calculate LDL- to follow up with PCP.Will fax labs to Madhavi Moulton CNP.  Vit D 27- to start Vit D OTC 5,000IU daily. Take with food.   Drug screen completed and reviewed  Nicotine (-). Discussed risks of nicotine a/w bariatric surgery. Must be nicotine free at least 90 days prior to surgery. Avoid nicotine life long post-op.   Denies current pregnancy. Advised not to get pregnant for 18 months post bariatric surgery as this can increase risk of malnourishment potentially leading to low birth weight or malformation. Patient agrees to avoid pregnancy for at least 18 months post-op. Discussed importance of appropriate contraception.  (Post menopausal)  Will need to be off work for 3-4 weeks post-bariatric surgery.  No lifting/pushing/pulling over 20# for 4 weeks post-op  No NSAIDS 10 days prior to bariatric surgery. Avoid NSAID use post-op  Discussed Lovenox post-op bariatric surgery  Awaiting LOMN - to take to PCP  Will continue following with multi-disciplinary team in preparation for bariatric surgery with the expectation of lifelong follow-up post-operatively.    Low BMI 37 will need to have diagnosis of sleep apnea to proceed with  bariatric surgery.  Initial apt 4/15.  To stop Metformin 2 days prior to surgery.   Return in about 1 month (around 5/9/2025).     I spent over 45 minutes with the patient, with greater that 50% of that time spent on education, counseling and coordination of care.     Electronically signed by WOODY Marrero on 4/9/2025 at 10:29 AM

## 2025-04-08 NOTE — PROGRESS NOTES
journal at next visit for further review . Pt states today has been working on increasing more vegetables, drinking water and watching portion sizes  Breakfast: 6:50am- yesterday had two eggs with cabbage, two crackers and coffee  Butter with olive oil used to cook eggs- 1/2 tsp  Lunch: 1:15pm- had lunch- daughter made chicken, rice and beans  Denies snack yesterday in afternoon   Dinner: 7pm- last nite had gizzards, red peppers, green peppers and onion. Italian dressing.   Snacks: no snack yesterday.  May have string cheese sticks or grapes.   Main Beverages: three 20 oz water bottles or 4 16.9 oz water bottles per day- mainly plain water-coffee 1 cup in am - no longer drinking coffee in afternoon, apple juice on occasion- dilutes it- 10 oz cup- pt advised to do this very sparingly.  Denies any pop today.    -Impression of Dietary Intake:  increasing awareness of food choices and portion sizes . - Pt  is working towards avoiding high fat/high sugar foods.  Pt  is working towards  including protein at meals and snacks.    Exercise:    Exercise Goal: Elliptical bike at home three days a week at least 10 minutes .  Admits can be tired after work.  Pt is agreeable to increase time on bike to 15 minutes in place of 10 minutes      Nutrition Diagnosis: Overweight/Obesity related to currently undergoing MNT as evidenced by BMI of 37.6    Intervention:   Healthy behaviors: increase physical activity and adequate fluid intake  Patient has attended support group online via [employer] with daughter present who translates.  Pt now will have access to German speaking only support groups via Kam[employer] for patients working toward weight loss surgery.  Will sign up online via [employer] and first support group is 5/28/25.   Education provided today to patient regarding fats with elevated lipids.  Serbian handout provided.  Pt was able to ask appropriate question regarding cooking methods at home and if should limit gizzards moving forward.  Did

## 2025-04-09 ENCOUNTER — OFFICE VISIT (OUTPATIENT)
Dept: BARIATRICS/WEIGHT MGMT | Age: 55
End: 2025-04-09

## 2025-04-09 ENCOUNTER — OFFICE VISIT (OUTPATIENT)
Dept: BARIATRICS/WEIGHT MGMT | Age: 55
End: 2025-04-09
Payer: MEDICAID

## 2025-04-09 VITALS
TEMPERATURE: 97.2 F | BODY MASS INDEX: 37.63 KG/M2 | DIASTOLIC BLOOD PRESSURE: 70 MMHG | SYSTOLIC BLOOD PRESSURE: 118 MMHG | HEIGHT: 63 IN | HEART RATE: 81 BPM | WEIGHT: 212.4 LBS

## 2025-04-09 DIAGNOSIS — M54.50 CHRONIC LOW BACK PAIN, UNSPECIFIED BACK PAIN LATERALITY, UNSPECIFIED WHETHER SCIATICA PRESENT: ICD-10-CM

## 2025-04-09 DIAGNOSIS — N20.0 NEPHROLITHIASIS: ICD-10-CM

## 2025-04-09 DIAGNOSIS — Z91.89 AT RISK FOR SLEEP APNEA: ICD-10-CM

## 2025-04-09 DIAGNOSIS — G89.29 CHRONIC LOW BACK PAIN, UNSPECIFIED BACK PAIN LATERALITY, UNSPECIFIED WHETHER SCIATICA PRESENT: ICD-10-CM

## 2025-04-09 DIAGNOSIS — E28.2 POLYCYSTIC OVARIAN SYNDROME: ICD-10-CM

## 2025-04-09 PROCEDURE — G8417 CALC BMI ABV UP PARAM F/U: HCPCS | Performed by: PHYSICIAN ASSISTANT

## 2025-04-09 PROCEDURE — G8427 DOCREV CUR MEDS BY ELIG CLIN: HCPCS | Performed by: PHYSICIAN ASSISTANT

## 2025-04-09 PROCEDURE — 3017F COLORECTAL CA SCREEN DOC REV: CPT | Performed by: PHYSICIAN ASSISTANT

## 2025-04-09 PROCEDURE — 1036F TOBACCO NON-USER: CPT | Performed by: PHYSICIAN ASSISTANT

## 2025-04-09 PROCEDURE — 99215 OFFICE O/P EST HI 40 MIN: CPT | Performed by: PHYSICIAN ASSISTANT

## 2025-04-09 RX ORDER — MELOXICAM 7.5 MG/1
7.5 TABLET ORAL DAILY
COMMUNITY

## 2025-04-09 RX ORDER — FEZOLINETANT 45 MG/1
TABLET, FILM COATED ORAL
COMMUNITY
Start: 2025-01-27

## 2025-04-09 RX ORDER — HYDROCODONE BITARTRATE AND ACETAMINOPHEN 5; 325 MG/1; MG/1
1 TABLET ORAL EVERY 6 HOURS PRN
COMMUNITY

## 2025-04-09 NOTE — PATIENT INSTRUCTIONS
Goals:  Nutrition Goal:  I will bring back my food journal.  Record meal times, serving sizes and bring back to next dietitian visit.  Exercise Goal:  I will increase my time to 15 minutes instead of 10 minutes on elliptical bike three times a week  Water Goal:  Continue at least 4 bottles of water or more per day

## 2025-04-09 NOTE — PATIENT INSTRUCTIONS
Behavior modification discussed in detail in regards to dietary habits.  Nutritional education occurred during visit. Continue following recommendations  per dietitian.  Improvement in fitness/exercise discussed with patient and the need for this  with/without surgery.  Evaluation and treatment for RASHEEDA - April 15th 1145 am at [facility]- Dr. Hoffman.  30 day CPAP download and sleep apnea clearance needed prior to surgery prn  EKG to be completed. Order given again today. Cardiac Clearance needed prior to surgery prn   Psychology evaluation with Dr. Simons 4/10 and 5/8  EGD prior to any surgical intervention.  Encouraged to attend support groups. Has completed x 2.   Seca scale next month  Initial labs completed and reviewed   Triglycerides 434/ cholesterol 230 unable to calculate LDL- to follow up with PCP.Will fax labs to Madhavi Moulton CNP.  Vit D 27- to start Vit D OTC 5,000IU daily. Take with food.   Drug screen completed and reviewed  Nicotine (-). Discussed risks of nicotine a/w bariatric surgery. Must be nicotine free at least 90 days prior to surgery. Avoid nicotine life long post-op.   Denies current pregnancy. Advised not to get pregnant for 18 months post bariatric surgery as this can increase risk of malnourishment potentially leading to low birth weight or malformation. Patient agrees to avoid pregnancy for at least 18 months post-op. Discussed importance of appropriate contraception.  (Post menopausal)  Will need to be off work for 3-4 weeks post-bariatric surgery.  No lifting/pushing/pulling over 20# for 4 weeks post-op  No NSAIDS 10 days prior to bariatric surgery. Avoid NSAID use post-op  Discussed Lovenox post-op bariatric surgery  Awaiting LOMN   Will continue following with multi-disciplinary team in preparation for bariatric surgery with the expectation of lifelong follow-up post-operatively.    Low BMI 37 will need to have diagnosis of sleep apnea to proceed with  bariatric surgery.

## 2025-04-10 ENCOUNTER — OFFICE VISIT (OUTPATIENT)
Dept: PSYCHOLOGY | Age: 55
End: 2025-04-10

## 2025-04-10 DIAGNOSIS — F54 PSYCHOLOGICAL FACTORS AFFECTING MEDICAL CONDITION: Primary | ICD-10-CM

## 2025-04-10 DIAGNOSIS — E66.01 MORBID OBESITY (HCC): ICD-10-CM

## 2025-04-10 ASSESSMENT — PATIENT HEALTH QUESTIONNAIRE - PHQ9
SUM OF ALL RESPONSES TO PHQ QUESTIONS 1-9: 0
1. LITTLE INTEREST OR PLEASURE IN DOING THINGS: NOT AT ALL
6. FEELING BAD ABOUT YOURSELF - OR THAT YOU ARE A FAILURE OR HAVE LET YOURSELF OR YOUR FAMILY DOWN: NOT AT ALL
SUM OF ALL RESPONSES TO PHQ QUESTIONS 1-9: 0
9. THOUGHTS THAT YOU WOULD BE BETTER OFF DEAD, OR OF HURTING YOURSELF: NOT AT ALL
7. TROUBLE CONCENTRATING ON THINGS, SUCH AS READING THE NEWSPAPER OR WATCHING TELEVISION: NOT AT ALL
8. MOVING OR SPEAKING SO SLOWLY THAT OTHER PEOPLE COULD HAVE NOTICED. OR THE OPPOSITE, BEING SO FIGETY OR RESTLESS THAT YOU HAVE BEEN MOVING AROUND A LOT MORE THAN USUAL: NOT AT ALL
5. POOR APPETITE OR OVEREATING: NOT AT ALL
3. TROUBLE FALLING OR STAYING ASLEEP: NOT AT ALL
2. FEELING DOWN, DEPRESSED OR HOPELESS: NOT AT ALL
4. FEELING TIRED OR HAVING LITTLE ENERGY: NOT AT ALL

## 2025-04-10 ASSESSMENT — ANXIETY QUESTIONNAIRES
7. FEELING AFRAID AS IF SOMETHING AWFUL MIGHT HAPPEN: NOT AT ALL
GAD7 TOTAL SCORE: 0
3. WORRYING TOO MUCH ABOUT DIFFERENT THINGS: NOT AT ALL
2. NOT BEING ABLE TO STOP OR CONTROL WORRYING: NOT AT ALL
6. BECOMING EASILY ANNOYED OR IRRITABLE: NOT AT ALL
1. FEELING NERVOUS, ANXIOUS, OR ON EDGE: NOT AT ALL
5. BEING SO RESTLESS THAT IT IS HARD TO SIT STILL: NOT AT ALL
4. TROUBLE RELAXING: NOT AT ALL

## 2025-04-10 NOTE — PROGRESS NOTES
Supervising Clinical Psychologist's Attestation Statement  The patient met the criteria for indirect supervision.  I discussed the findings and plans with the Clinical Psychology Trainee and agree as documented in her note .     Electronically signed by April Simons, PhD on 4/10/25 at 9:45 AM EDT       ROUTINE PSYCHOLOGICAL EVALUATION FOR BARIATRIC SURGERY:  India Singh is a 54 y.o. year-old, female with morbid obesity (BMI 37.62), referred for a routine psychiatric evaluation for bariatric surgery.     A  contracted with [facility] was utilized for this appointment given patient's primary language is Azeri. The following phone interpretation number was contacted [phone]. Initially, interpretation was provided by rajinder Galeas number [id number]; however, call was disconnected abruptly with Adal.  rajinder Patten number [id number] was then contacted to complete the evaluation process.       WEIGHT HISTORY  India Singh has considered bariatric surgery since: 2024.  Overweight since: 2011.   Weight Loss Attempts include (self-directed/formal): Patient reports that she has attempted self-directed dietary measures such as: the juice diet, tea diet, Herbalife, and intermittent fasting. Patient denies any history of medication management for weight loss. Patient denies ever losing more than 5lbs with any self-directed weight loss attempts.      Eating Behaviors endorsed by patient: poor food choices; large portions; and skipping meals. She denies any concern with stress eating or emotional eating, eating when bored, grazing, or nighttime eating. Patient reports she has improved her food choices by eating less rice and pasta. Patient notes that she is incorporating more vegetables. Patient reports that she is controlling her portions by being more mindful to what is on her plate. Patient notes that she is no longer skipping meals, but will occasionally wait until later in the day

## 2025-04-10 NOTE — PROGRESS NOTES
Supervising Clinical Psychologist's Attestation Statement  The patient met the criteria for indirect supervision.  I discussed the findings and plans with the Clinical Psychology Trainee and agree as documented in her note .    Electronically signed by April Simons, PhD on 4/10/25 at 9:45 AM EDT

## 2025-05-08 ENCOUNTER — HOSPITAL ENCOUNTER (OUTPATIENT)
Dept: NON INVASIVE DIAGNOSTICS | Age: 55
Discharge: HOME | End: 2025-05-08

## 2025-05-08 ENCOUNTER — OFFICE VISIT (OUTPATIENT)
Dept: PSYCHOLOGY | Age: 55
End: 2025-05-08
Payer: MEDICAID

## 2025-05-08 DIAGNOSIS — F54 PSYCHOLOGICAL FACTORS AFFECTING MEDICAL CONDITION: Primary | ICD-10-CM

## 2025-05-08 DIAGNOSIS — E66.01 MORBID OBESITY (HCC): ICD-10-CM

## 2025-05-08 LAB
EKG ATRIAL RATE: 73 BPM
EKG P AXIS: 65 DEGREES
EKG P-R INTERVAL: 138 MS
EKG Q-T INTERVAL: 396 MS
EKG QRS DURATION: 92 MS
EKG QTC CALCULATION (BAZETT): 436 MS
EKG R AXIS: 51 DEGREES
EKG T AXIS: 56 DEGREES
EKG VENTRICULAR RATE: 73 BPM

## 2025-05-08 PROCEDURE — 90832 PSYTX W PT 30 MINUTES: CPT | Performed by: PSYCHOLOGIST

## 2025-05-08 PROCEDURE — 1036F TOBACCO NON-USER: CPT | Performed by: PSYCHOLOGIST

## 2025-05-08 ASSESSMENT — ANXIETY QUESTIONNAIRES
5. BEING SO RESTLESS THAT IT IS HARD TO SIT STILL: NOT AT ALL
4. TROUBLE RELAXING: NOT AT ALL
GAD7 TOTAL SCORE: 0
6. BECOMING EASILY ANNOYED OR IRRITABLE: NOT AT ALL
3. WORRYING TOO MUCH ABOUT DIFFERENT THINGS: NOT AT ALL
7. FEELING AFRAID AS IF SOMETHING AWFUL MIGHT HAPPEN: NOT AT ALL
2. NOT BEING ABLE TO STOP OR CONTROL WORRYING: NOT AT ALL
1. FEELING NERVOUS, ANXIOUS, OR ON EDGE: NOT AT ALL

## 2025-05-08 ASSESSMENT — PATIENT HEALTH QUESTIONNAIRE - PHQ9
SUM OF ALL RESPONSES TO PHQ QUESTIONS 1-9: 0
3. TROUBLE FALLING OR STAYING ASLEEP: NOT AT ALL
9. THOUGHTS THAT YOU WOULD BE BETTER OFF DEAD, OR OF HURTING YOURSELF: NOT AT ALL
SUM OF ALL RESPONSES TO PHQ QUESTIONS 1-9: 0
4. FEELING TIRED OR HAVING LITTLE ENERGY: NOT AT ALL
5. POOR APPETITE OR OVEREATING: NOT AT ALL
SUM OF ALL RESPONSES TO PHQ QUESTIONS 1-9: 0
2. FEELING DOWN, DEPRESSED OR HOPELESS: NOT AT ALL
8. MOVING OR SPEAKING SO SLOWLY THAT OTHER PEOPLE COULD HAVE NOTICED. OR THE OPPOSITE, BEING SO FIGETY OR RESTLESS THAT YOU HAVE BEEN MOVING AROUND A LOT MORE THAN USUAL: NOT AT ALL
6. FEELING BAD ABOUT YOURSELF - OR THAT YOU ARE A FAILURE OR HAVE LET YOURSELF OR YOUR FAMILY DOWN: NOT AT ALL
1. LITTLE INTEREST OR PLEASURE IN DOING THINGS: NOT AT ALL
SUM OF ALL RESPONSES TO PHQ QUESTIONS 1-9: 0
7. TROUBLE CONCENTRATING ON THINGS, SUCH AS READING THE NEWSPAPER OR WATCHING TELEVISION: NOT AT ALL

## 2025-05-08 NOTE — PROGRESS NOTES
Supervising Clinical Psychologist's Attestation Statement  The patient met the criteria for indirect supervision. I discussed the findings and plans with the Clinical Psychology Trainee and agree as documented in her note.     Electronically signed by April Simons, PhD     PSYCHOLOGICAL FOLLOW-UP FOR BARIATRIC SURGERY:    A  contracted with [facility] was utilized for this appointment given patient's primary language is Yakut. The following phone interpretation number was contacted [phone].  rajinder Galo number [id number], was utilized to complete the evaluation process.      History of Presenting Illness:   Ms. India Singh was initially referred for a routine psychological evaluation for bariatric surgery on 04/10/2025. At this evaluation, the following recommendations were given prior to psychological clearance for WLS:  - Begin planned exercise.   - continue to make progress towards healthy lifestyle change congruent with bariatric surgery candidacy.     Assessment:  Patient reports that there have been no major changes since her initial evaluation on 04/10/2025. She reports that her work hours have changed a bit, and oftentimes she's home in the morning. She reports eating breakfast a little bit later in the day due to sleeping in.     Eating behaviors: Patient reports her food choices; portion sizes; and skipping meals all continue to improve. She reports that she has not skipped any meals in the past month despite eating a little bit later in the day due to changes in her work schedule. Patient continues to reduce carbs in her diet and is mindful to portion sizing.     Patient continues to deny any food restriction, bingeing, or purging.     Caffeine/carbonation: Patient reports continuing to have one 8oz cup of coffee daily. She continues to deny any carbonation.      Exercise: Patient continues to deny any planned exercise at this time. Despite this, patient

## 2025-05-13 NOTE — PROGRESS NOTES
Assessment: Patient is a 54 y.o. female seen for  month two  follow up MNT visit for  pre op bariatric surgery desires sleeve    Translation Services were used for this visit- Kyrgyz Speaking.  Session Code [id number] with Polly Mock at appointment by self today.     Vitals from current and previous visits:      5/14/2025  9:58 AM   Vitals    SYSTOLIC 130    DIASTOLIC 80    BP Site Right Upper Arm    Patient Position Sitting    BP Cuff Size Large Adult    Pulse 73    Temp 97.3 °F (36.3 °C)    Respirations    SpO2    Weight - Scale 215 lb    Height 5' 2.5\"    Body Mass Index 38.7 kg/m2 (H)         Initial weight at start of Weight Management Program was: 214 lbs     [place] up 3 lbs in one month  -Weight goal: lose weight.     -Nutritionally relevant labs: Initial Labs March 2025- Iron-57, Iron Saturation 20%, glucose-108, chol-230, TG-434, B1-170, Vit D-27  Lab Results   Component Value Date/Time    LABA1C 5.6 03/27/2025 08:05 AM    LABA1C 5.4 07/24/2023 08:20 AM    GLUCOSE 108 (H) 03/27/2025 08:05 AM    GLUCOSE 109 (H) 07/24/2023 08:20 AM    CHOL 230 (H) 03/27/2025 08:05 AM    HDL 48 03/27/2025 08:05 AM    TRIG 434 (H) 03/27/2025 08:05 AM                  Lab Results   Component Value Date/Time    VITD25 27.1 03/27/2025 08:05 AM     Repeat Mammogram completed  Dr Simons clearance obtained  RASHEEDA al appointment r/s to 7/2/25.  Working on sooner appt.    - Is patient taking daily Multivitamin:  Omega 3 two pills in morning and two at nite    Started  Vitamin D3 2,000IUs daily for level of 27 - prescribed by family doctor      Sleep Habits:  goes to bed ~ between 8p-11pm.  May work until 830 pm and starts work as early as 6am.  Picks up grand child between 2-4pm daily then returns to work in evening    Grocery Shopping in household done by: self  Cooking in household done by: self and daughter  Currently lives in apartment.  Lives by self    -Food Recall:  States increased picking/snacking on mothers day with  Flan, cakes

## 2025-05-14 ENCOUNTER — OFFICE VISIT (OUTPATIENT)
Dept: BARIATRICS/WEIGHT MGMT | Age: 55
End: 2025-05-14
Payer: MEDICAID

## 2025-05-14 ENCOUNTER — OFFICE VISIT (OUTPATIENT)
Dept: BARIATRICS/WEIGHT MGMT | Age: 55
End: 2025-05-14

## 2025-05-14 VITALS
DIASTOLIC BLOOD PRESSURE: 80 MMHG | TEMPERATURE: 97.3 F | HEART RATE: 73 BPM | BODY MASS INDEX: 38.09 KG/M2 | HEIGHT: 63 IN | SYSTOLIC BLOOD PRESSURE: 130 MMHG | WEIGHT: 215 LBS

## 2025-05-14 DIAGNOSIS — Z91.89 AT RISK FOR SLEEP APNEA: ICD-10-CM

## 2025-05-14 DIAGNOSIS — M54.50 CHRONIC LOW BACK PAIN, UNSPECIFIED BACK PAIN LATERALITY, UNSPECIFIED WHETHER SCIATICA PRESENT: ICD-10-CM

## 2025-05-14 DIAGNOSIS — N20.0 NEPHROLITHIASIS: ICD-10-CM

## 2025-05-14 DIAGNOSIS — E28.2 POLYCYSTIC OVARIAN SYNDROME: ICD-10-CM

## 2025-05-14 DIAGNOSIS — G89.29 CHRONIC LOW BACK PAIN, UNSPECIFIED BACK PAIN LATERALITY, UNSPECIFIED WHETHER SCIATICA PRESENT: ICD-10-CM

## 2025-05-14 PROCEDURE — 3017F COLORECTAL CA SCREEN DOC REV: CPT | Performed by: PHYSICIAN ASSISTANT

## 2025-05-14 PROCEDURE — G8417 CALC BMI ABV UP PARAM F/U: HCPCS | Performed by: PHYSICIAN ASSISTANT

## 2025-05-14 PROCEDURE — 99214 OFFICE O/P EST MOD 30 MIN: CPT | Performed by: PHYSICIAN ASSISTANT

## 2025-05-14 PROCEDURE — 1036F TOBACCO NON-USER: CPT | Performed by: PHYSICIAN ASSISTANT

## 2025-05-14 PROCEDURE — G8427 DOCREV CUR MEDS BY ELIG CLIN: HCPCS | Performed by: PHYSICIAN ASSISTANT

## 2025-05-14 RX ORDER — CHOLECALCIFEROL (VITAMIN D3) 10 MCG
CAPSULE ORAL
COMMUNITY
Start: 2025-04-28

## 2025-05-14 NOTE — PATIENT INSTRUCTIONS
Goals:  Nutrition Goal:  I will bring back my food journal.  Record meal times, serving sizes and bring back to next dietitian visit.  Exercise Goal:  Use elliptical bike three times a week for total of 15 minutes total each time.  Water Goal:  Continue at least 4 bottles of water or more per day - good job with this!    No more than 1/2- 1 cup of rice at a meal for portion control.  Focus on lean protein foods, vegetables and fruit with meals  Malian support is end of May on May 28th in Malian.

## 2025-05-14 NOTE — PROGRESS NOTES
[facility]  [address], [address]  [place] [address]  [phone]      Visit Date:  5/14/2025  Weight Management Pre-Op Follow-up    HPI:    Medically Supervised follow-up- Month #2 of 6- desires sleeradha Singh is here today for continued supervised weight management of obesity. Used translation services for this visit ( Ana Luisa #[id number]). Weight today 215#.  Up 3# since last month. Up 1# since starting with weight management. BMI 38.  Report that she has done better with nutrition most of the month.  Admits that she struggled with nutrition over Mothers Day weekend.  Has been tracking food intake, but forgot to bring to appointment today. Reports  eating 3 meals and 2 snacks daily. On a better schedule with eating.  Has been doing better with decreasing portions.  Her daughter continues to prepare most of her meals but is more mindful of food choices.   Not eating past 7pm. Drinking 4 bottles of water daily. Occasional pear juice. No pop/carbonation.  Drinking 1 cup of coffee daily.  Has been staying active at work. Has been using home stationary bike for 5-10 minutes 3-4 times per week. Comorbid conditions include PCOS, nephrolithiasis, chronic low back pain. Initial labs completed and reviewed. Triglycerides 434/ cholesterol 230 unable to calculate LDL- PCP rx medication (unsure name).  Vit D 27- started Vit D OTC 2,000IU daily.  LOMN received.  Completed support groups x 2. EKG completed. NSR. Nml EKG. Discussed pre-op EGD - will order at a later date- will wait to see if has RASHEEDA prior to ordering additional testing.   Initial RASHEEDA evaluation rescheduled to July 2nd..  Will need 30 day download and sleep apnea clearance prior to surgery. Psychological clearance with Dr. Simons completed and reviewed.  Low BMI 37 will need to have diagnosis of sleep apnea to proceed with  bariatric surgery.  SECA scale completed and reviewed.   If she does not lose enough

## 2025-05-14 NOTE — PATIENT INSTRUCTIONS
Modificación de la conducta discutida en detalle con respecto a los hábitos dietéticos.   La educación nutricional se llevó a cabo tyrone la visita. Continúe siguiendo las recomendaciones del dietista.   La mejora en la condición física/ejercicio se discutió con el paciente y la necesidad de esto con/sin cirugía.   Evaluación y tratamiento para la AOS - por paciente reprogramado para el 2 de julio en [facility]- Dr. Hoffman.   Se necesita matias descarga de CPAP de 30 días y matias autorización de apnea del sueño antes de completar la cirugía.   NSR. Electrocardiograma NML.   Evaluación psicológica con el Dr. Simons completada y revisada.    EGD antes de cualquier intervención quirúrgica- se ordenará más tarde.   Se les anima a asistir a grupos de apoyo. Ha completado x 2.    [place] completada y revisada.    Los laboratorios iniciales completaron y revisaron los triglicéridos 434 / colesterol 230 sin poder calcular el LDL, siguiendo con Madhavi Moulton CNP. Comenzó la medicación (nombre inseguro: el paciente lo verificará y lo llevará a la próxima tobin).   Vit D 27- tomando Vit D OTC 2.000UI diariamente con alimentos  Un IMC bajo de 37 necesitará tener un diagnóstico de apnea del sueño para proceder con la cirugía bariátrica.    Apto inicial 7/2 Para suspender la metformina 2 días antes de la cirugía.

## 2025-06-03 ENCOUNTER — TELEPHONE (OUTPATIENT)
Age: 55
End: 2025-06-03

## 2025-06-03 NOTE — TELEPHONE ENCOUNTER
Called patient to schedule new sleep consult. Her daughter answered as patient is Mongolian speaking. Verified patients demographics. Patient is already scheduled at [facility] [place] on 7/2/25 with Dr. Callaway. Patient would like to keep this one as this is closer for her and we would not be able to get her in for an appointment until the second week of July. I gave her the Name of doctor, the day and time, and location in [place]. Advised her to call our office back if there is anything we can do for them. She verbalized understanding.

## 2025-06-24 NOTE — PROGRESS NOTES
Assessment: Patient is a 54 y.o. female seen for  month two  follow up MNT visit for  pre op bariatric surgery desires sleeve    Translation Services were used for this visit- Belarusian Speaking.  Session Code [id number] with Liv  Pt at appointment by self today.     Vitals from current and previous visits:         6/25/2025  10:58 AM   Vitals    SYSTOLIC 124    DIASTOLIC 86    BP Site Right Upper Arm    Patient Position Sitting    BP Cuff Size Large Adult    Pulse 79    Temp 97 °F (36.1 °C)    Respirations    SpO2    Weight - Scale 214 lb 12.8 oz    Height 5' 2.5\"    Body Mass Index 38.66 kg/m2 (H)    Pain Level       Initial weight at start of Weight Management Program was: 214 lbs     [place] down 1 lb in one month  -Weight goal: lose weight.     -Nutritionally relevant labs: Initial Labs March 2025- Iron-57, Iron Saturation 20%, glucose-108, chol-230, TG-434, B1-170, Vit D-27  Lab Results   Component Value Date/Time    LABA1C 5.6 03/27/2025 08:05 AM    LABA1C 5.4 07/24/2023 08:20 AM    GLUCOSE 108 (H) 03/27/2025 08:05 AM    GLUCOSE 109 (H) 07/24/2023 08:20 AM    CHOL 230 (H) 03/27/2025 08:05 AM    HDL 48 03/27/2025 08:05 AM    TRIG 434 (H) 03/27/2025 08:05 AM                  Lab Results   Component Value Date/Time    VITD25 27.1 03/27/2025 08:05 AM     Repeat Mammogram completed  Dr Simons clearance obtained  RASHEEDA eval appointment 7/2/25 in [place]    - Is patient taking daily Multivitamin:  Omega 3 two pills in morning and two at nite    Started  Vitamin D3 2,000IUs daily for level of 27 - prescribed by family doctor      Grocery Shopping in household done by: self  Cooking in household done by: self and daughter  Currently lives in apartment.  Lives by self    -Food Recall:  had food journal in office today for review  Breakfast:7:00am- coffee with 2% milk, 2 scrambled eggs and three crackers  10:15am- string cheese stick and water  Lunch: 1:10pm- boiled potato with chicken, lettuce, tomato and sour cream  2:40pm-

## 2025-06-25 ENCOUNTER — OFFICE VISIT (OUTPATIENT)
Dept: BARIATRICS/WEIGHT MGMT | Age: 55
End: 2025-06-25
Payer: MEDICAID

## 2025-06-25 ENCOUNTER — OFFICE VISIT (OUTPATIENT)
Dept: BARIATRICS/WEIGHT MGMT | Age: 55
End: 2025-06-25

## 2025-06-25 VITALS
HEART RATE: 79 BPM | DIASTOLIC BLOOD PRESSURE: 86 MMHG | HEIGHT: 63 IN | BODY MASS INDEX: 38.06 KG/M2 | WEIGHT: 214.8 LBS | SYSTOLIC BLOOD PRESSURE: 124 MMHG | TEMPERATURE: 97 F

## 2025-06-25 DIAGNOSIS — G89.29 CHRONIC LOW BACK PAIN, UNSPECIFIED BACK PAIN LATERALITY, UNSPECIFIED WHETHER SCIATICA PRESENT: ICD-10-CM

## 2025-06-25 DIAGNOSIS — M54.50 CHRONIC LOW BACK PAIN, UNSPECIFIED BACK PAIN LATERALITY, UNSPECIFIED WHETHER SCIATICA PRESENT: ICD-10-CM

## 2025-06-25 DIAGNOSIS — Z91.89 AT RISK FOR SLEEP APNEA: ICD-10-CM

## 2025-06-25 DIAGNOSIS — N20.0 NEPHROLITHIASIS: ICD-10-CM

## 2025-06-25 DIAGNOSIS — R73.03 PRE-DIABETES: ICD-10-CM

## 2025-06-25 DIAGNOSIS — E78.5 HYPERLIPIDEMIA, UNSPECIFIED HYPERLIPIDEMIA TYPE: ICD-10-CM

## 2025-06-25 PROCEDURE — 3017F COLORECTAL CA SCREEN DOC REV: CPT | Performed by: PHYSICIAN ASSISTANT

## 2025-06-25 PROCEDURE — G8427 DOCREV CUR MEDS BY ELIG CLIN: HCPCS | Performed by: PHYSICIAN ASSISTANT

## 2025-06-25 PROCEDURE — 99214 OFFICE O/P EST MOD 30 MIN: CPT | Performed by: PHYSICIAN ASSISTANT

## 2025-06-25 PROCEDURE — G8417 CALC BMI ABV UP PARAM F/U: HCPCS | Performed by: PHYSICIAN ASSISTANT

## 2025-06-25 PROCEDURE — 1036F TOBACCO NON-USER: CPT | Performed by: PHYSICIAN ASSISTANT

## 2025-06-25 NOTE — PATIENT INSTRUCTIONS
Goals:    Exercise Goal:  Use elliptical bike every day at least 10 minutes at  a time and can do this several time a day  Water Goal:  Continue at least 4 bottles of water or more per day - good job with this!    No more than 1/2- 1 cup of rice at a meal for portion control.  Focus on lean protein foods, vegetables and fruit with meals  See sample menus given to you today for healthy food ideas.   Recommend 1% milk instead of 2% milk

## 2025-06-25 NOTE — PATIENT INSTRUCTIONS
Behavior modification discussed in detail in regards to dietary habits.  Nutritional education occurred during visit. Continue following recommendations  per dietitian.  Improvement in fitness/exercise discussed with patient and the need for this  with/without surgery.  Evaluation and treatment for RASHEEDA - per patient rescheduled to July 2 at [facility]- Dr. Hoffman.  30 day CPAP download and sleep apnea clearance needed prior to surgery prn  EKG completed. NSR. nml EKG.  Psychology evaluation with Dr. Simons completed and reviewed.   EGD prior to any surgical intervention- will send referral  Encouraged to attend support groups. Has completed x 2.   Seca scale completed and reviewed.   Initial labs completed and reviewed   Triglycerides 434/ cholesterol 230 unable to calculate LDL- following with  Madhavi Moulton CNP.   Vit D 27- taking Vit D OTC 2,000IU daily with food.   Drug screen completed and reviewed  Nicotine (-). Discussed risks of nicotine a/w bariatric surgery. Must be nicotine free at least 90 days prior to surgery. Avoid nicotine life long post-op.   Denies current pregnancy. Advised not to get pregnant for 18 months post bariatric surgery as this can increase risk of malnourishment potentially leading to low birth weight or malformation. Patient agrees to avoid pregnancy for at least 18 months post-op. Discussed importance of appropriate contraception.  (Post menopausal)  Will need to be off work for 3-4 weeks post-bariatric surgery.  No lifting/pushing/pulling over 20# for 4 weeks post-op  No NSAIDS 10 days prior to bariatric surgery. Avoid NSAID use post-op  Discussed Lovenox post-op bariatric surgery  LOMN received.   Will continue following with multi-disciplinary team in preparation for bariatric surgery with the expectation of lifelong follow-up post-operatively.    Low BMI 38 will need to have diagnosis of sleep apnea to proceed with  bariatric surgery.  Initial apt 7/2  To stop Metformin

## 2025-06-25 NOTE — CONSULTS
Session ID: [id number]  Session Duration: Longer than 53 minutes  Language: Georgian   ID: #[id number]   Name: Doroteo

## 2025-06-25 NOTE — PROGRESS NOTES
surgery as this can increase risk of malnourishment potentially leading to low birth weight or malformation. Patient agrees to avoid pregnancy for at least 18 months post-op. Discussed importance of appropriate contraception.  (Post menopausal)  Will need to be off work for 3-4 weeks post-bariatric surgery.  No lifting/pushing/pulling over 20# for 4 weeks post-op  No NSAIDS 10 days prior to bariatric surgery. Avoid NSAID use post-op  Discussed Lovenox post-op bariatric surgery  LOMN received.   Will continue following with multi-disciplinary team in preparation for bariatric surgery with the expectation of lifelong follow-up post-operatively.   To stop Metformin 2 days prior to surgery.   Return in about 1 month (around 7/25/2025) for Follow up.     I spent over 35 minutes with the patient, with greater that 50% of that time spent on education, counseling and coordination of care.     Electronically signed by WOODY Marrero on 6/25/2025 at 11:34 AM

## 2025-07-02 ENCOUNTER — OFFICE VISIT (OUTPATIENT)
Dept: PULMONOLOGY | Age: 55
End: 2025-07-02
Payer: MEDICAID

## 2025-07-02 VITALS
SYSTOLIC BLOOD PRESSURE: 122 MMHG | HEART RATE: 78 BPM | OXYGEN SATURATION: 95 % | DIASTOLIC BLOOD PRESSURE: 84 MMHG | WEIGHT: 216.6 LBS | BODY MASS INDEX: 38.99 KG/M2

## 2025-07-02 DIAGNOSIS — R73.03 PRE-DIABETES: ICD-10-CM

## 2025-07-02 DIAGNOSIS — G47.30 SLEEP-RELATED BREATHING DISORDER: Primary | ICD-10-CM

## 2025-07-02 PROCEDURE — 1036F TOBACCO NON-USER: CPT | Performed by: INTERNAL MEDICINE

## 2025-07-02 PROCEDURE — 99214 OFFICE O/P EST MOD 30 MIN: CPT | Performed by: INTERNAL MEDICINE

## 2025-07-02 PROCEDURE — 99203 OFFICE O/P NEW LOW 30 MIN: CPT | Performed by: INTERNAL MEDICINE

## 2025-07-02 PROCEDURE — G8417 CALC BMI ABV UP PARAM F/U: HCPCS | Performed by: INTERNAL MEDICINE

## 2025-07-02 PROCEDURE — 3017F COLORECTAL CA SCREEN DOC REV: CPT | Performed by: INTERNAL MEDICINE

## 2025-07-02 PROCEDURE — G8427 DOCREV CUR MEDS BY ELIG CLIN: HCPCS | Performed by: INTERNAL MEDICINE

## 2025-07-02 NOTE — PROGRESS NOTES
REASON FOR THE CONSULTATION:  Chief Complaint   Patient presents with    New Patient      At risk for obstructive sleep apnea Turkmen SPEAKING        HISTORY OF PRESENT ILLNESS:    India Singh is a 54 y.o. year old female   History of Present Illness  The patient presents via virtual visit for sleep apnea. She is accompanied by an .    She is scheduled for bariatric surgery in [place] and requires a sleep apnea test as part of the preoperative evaluation. She lives independently, with her children residing nearby. Her granddaughter has observed her snoring during sleepovers and  instances of apnea. The snoring is described as bothersome, leading to fewer sleepovers. She does not experience choking or gasping at night. Her sleep is restless, often characterized by tossing and turning.    Her bedtime varies depending on her daily activities, typically around 7:30 PM or 8:00 PM if she does not have afternoon work. She wakes up around 5:30 AM or earlier, often due to feeling hot. She does not frequently wake up to use the restroom at night, usually only once in the morning. She reports poor sleep quality, attributing it to heat or stress, and often feels tired. Her caffeine intake is limited to one cup of coffee in the morning. She occasionally falls asleep on the sofa, depending on her level of fatigue, but does not fall asleep while driving.    She does not smoke cigarettes, use drugs, or consume alcohol. She does not have any pets at home. 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    She is prediabetic.    SOCIAL HISTORY  Occupations: Works part-time in a kitchen at a restaurant, 3 days a week.  Alcohol: Does not consume alcohol.  Tobacco: Does not smoke.  Recreational Drugs: Does not use drugs.  Coffee/Tea/Caffeine-containing Drinks: Drinks 1 cup of coffee in the morning.                    LUNG CANCER SCREENING     CRITERIA MET    []     CT

## 2025-07-18 NOTE — PROGRESS NOTES
Assessment: Patient is a 54 y.o. female seen for  month four  follow up MNT visit for  pre op bariatric surgery desires sleeve    Translation Services were used for this visit- Finnish Speaking.  Session Code [id number] with Wendy.     Vitals from current and previous visits:         7/21/2025  1:20 PM   Vitals    SYSTOLIC 118    DIASTOLIC 86    BP Site Right Upper Arm    Patient Position Sitting    BP Cuff Size Large Adult    Pulse 79    Temp 98.4 °F (36.9 °C)    Respirations    SpO2    Weight - Scale 212 lb 9.6 oz    Height 5' 2.5\"    Body Mass Index 38.27 kg/m2 (H)       Initial weight at start of Weight Management Program was: 214 lbs     India down 2 lbs in one month  -Weight goal: lose weight.     -Nutritionally relevant labs: Initial Labs March 2025- Iron-57, Iron Saturation 20%, glucose-108, chol-230, TG-434, B1-170, Vit D-27  Lab Results   Component Value Date/Time    LABA1C 5.6 03/27/2025 08:05 AM    LABA1C 5.4 07/24/2023 08:20 AM    GLUCOSE 108 (H) 03/27/2025 08:05 AM    GLUCOSE 109 (H) 07/24/2023 08:20 AM    CHOL 230 (H) 03/27/2025 08:05 AM    HDL 48 03/27/2025 08:05 AM    TRIG 434 (H) 03/27/2025 08:05 AM                  Lab Results   Component Value Date/Time    VITD25 27.1 03/27/2025 08:05 AM     Repeat Mammogram completed  Dr Simons clearance obtained  Pulmonary at [facility]- PFT 7/23/25 and Sleep study scheduled 8/4/25    - Is patient taking daily Multivitamin:  Omega 3 today states one pill in morning and one at nite    Vitamin D3 2,000IUs daily for level of 27 - prescribed by family doctor  Magnesium pill once a day      Grocery Shopping in household done by: self  Cooking in household done by: self and daughter  Currently lives in apartment.  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 Also on R, Friday and Saturday working at restaurant 5-8pm  Breakfast:6:00am- coffee with 1% milk, eggs and now adding

## 2025-07-21 ENCOUNTER — OFFICE VISIT (OUTPATIENT)
Dept: BARIATRICS/WEIGHT MGMT | Age: 55
End: 2025-07-21

## 2025-07-21 ENCOUNTER — OFFICE VISIT (OUTPATIENT)
Dept: BARIATRICS/WEIGHT MGMT | Age: 55
End: 2025-07-21
Payer: MEDICAID

## 2025-07-21 VITALS
DIASTOLIC BLOOD PRESSURE: 86 MMHG | HEART RATE: 79 BPM | BODY MASS INDEX: 37.67 KG/M2 | TEMPERATURE: 98.4 F | WEIGHT: 212.6 LBS | SYSTOLIC BLOOD PRESSURE: 118 MMHG | HEIGHT: 63 IN

## 2025-07-21 DIAGNOSIS — G89.29 CHRONIC LOW BACK PAIN, UNSPECIFIED BACK PAIN LATERALITY, UNSPECIFIED WHETHER SCIATICA PRESENT: ICD-10-CM

## 2025-07-21 DIAGNOSIS — E28.2 POLYCYSTIC OVARIAN SYNDROME: ICD-10-CM

## 2025-07-21 DIAGNOSIS — E78.5 HYPERLIPIDEMIA, UNSPECIFIED HYPERLIPIDEMIA TYPE: ICD-10-CM

## 2025-07-21 DIAGNOSIS — R73.03 PRE-DIABETES: ICD-10-CM

## 2025-07-21 DIAGNOSIS — Z91.89 AT RISK FOR SLEEP APNEA: ICD-10-CM

## 2025-07-21 DIAGNOSIS — M54.50 CHRONIC LOW BACK PAIN, UNSPECIFIED BACK PAIN LATERALITY, UNSPECIFIED WHETHER SCIATICA PRESENT: ICD-10-CM

## 2025-07-21 DIAGNOSIS — N20.0 NEPHROLITHIASIS: ICD-10-CM

## 2025-07-21 PROCEDURE — G8427 DOCREV CUR MEDS BY ELIG CLIN: HCPCS | Performed by: PHYSICIAN ASSISTANT

## 2025-07-21 PROCEDURE — G8417 CALC BMI ABV UP PARAM F/U: HCPCS | Performed by: PHYSICIAN ASSISTANT

## 2025-07-21 PROCEDURE — 3017F COLORECTAL CA SCREEN DOC REV: CPT | Performed by: PHYSICIAN ASSISTANT

## 2025-07-21 PROCEDURE — 1036F TOBACCO NON-USER: CPT | Performed by: PHYSICIAN ASSISTANT

## 2025-07-21 PROCEDURE — 99213 OFFICE O/P EST LOW 20 MIN: CPT | Performed by: PHYSICIAN ASSISTANT

## 2025-07-21 RX ORDER — AMOXICILLIN 500 MG/1
TABLET, FILM COATED ORAL
COMMUNITY
Start: 2025-07-17

## 2025-07-21 RX ORDER — CLARITHROMYCIN 500 MG/1
TABLET ORAL
COMMUNITY
Start: 2025-07-17

## 2025-07-21 RX ORDER — OMEPRAZOLE 40 MG/1
CAPSULE, DELAYED RELEASE ORAL
COMMUNITY
Start: 2025-07-13

## 2025-07-21 NOTE — PROGRESS NOTES
LABA1C 5.6 03/27/2025 08:05 AM        TSH   Lab Results   Component Value Date/Time    TSH 4.57 03/27/2025 08:05 AM        IRON   Lab Results   Component Value Date/Time    IRON 57 03/27/2025 08:06 AM        TIBC  Lab Results   Component Value Date/Time    TIBC 288 03/27/2025 08:06 AM       FERRITIN  Lab Results   Component Value Date/Time    FERRITIN 103 03/27/2025 08:05 AM       VITAMIN A  No results found for: \"RETINOL\"    NICOTINE  No results found for: \"NMET\"    UDS  No results found for: \"UDP\"    PSA  No results found for: \"LABPSA\"    GFR  Lab Results   Component Value Date/Time    LABGLOM >90 03/27/2025 08:05 AM    LABGLOM >60 07/24/2023 08:20 AM       DEXA  No results found for this or any previous visit.         Assessment:       Diagnosis Orders   1. BMI 38.0-38.9,adult        2. At risk for sleep apnea        3. Chronic low back pain, unspecified back pain laterality, unspecified whether sciatica present        4. Nephrolithiasis        5. Pre-diabetes        6. Hyperlipidemia, unspecified hyperlipidemia type        7. Polycystic ovarian syndrome              Plan:    Behavior modification discussed in detail in regards to dietary habits.  Nutritional education occurred during visit. Continue following recommendations  per dietitian.  Improvement in fitness/exercise discussed with patient and the need for this  with/without surgery.  Evaluation and treatment for RASHEEDA. Sleep study 8/4. PFT 7/23.  Dr. Hoffman.  30 day CPAP download and sleep apnea clearance needed prior to surgery prn  EKG completed. NSR. nml EKG.  Psychology evaluation with Dr. Simons completed and reviewed.   EGD completed. Currently getting treatment for H. Pylori.   Will need to repeat testing after treatment completed.  (Carol to call for EGD/ path results)  Encouraged to attend support groups. Has completed x 2.   Seca scale completed and reviewed.   Initial labs completed and reviewed   Triglycerides 434/ cholesterol 230 unable to

## 2025-07-21 NOTE — PATIENT INSTRUCTIONS
Behavior modification discussed in detail in regards to dietary habits.  Nutritional education occurred during visit. Continue following recommendations  per dietitian.  Improvement in fitness/exercise discussed with patient and the need for this  with/without surgery.  Evaluation and treatment for RASHEEDA. Sleep study 8/4. PFT 7/23.  Dr. Hoffman.  30 day CPAP download and sleep apnea clearance needed prior to surgery prn  EKG completed. NSR. nml EKG.  Psychology evaluation with Dr. Simons completed and reviewed.   EGD completed. Currently getting treatment for H. Pylori.   Will phillip to repeat testing after treatment completed.   Encouraged to attend support groups. Has completed x 2.   Seca scale completed and reviewed.   Initial labs completed and reviewed   Triglycerides 434/ cholesterol 230 unable to calculate LDL- following with  Madhavi Moulton CNP.   Vit D 27- taking Vit D OTC 2,000IU daily with food.   Drug screen completed and reviewed  Nicotine (-). Discussed risks of nicotine a/w bariatric surgery. Must be nicotine free at least 90 days prior to surgery. Avoid nicotine life long post-op.   Denies current pregnancy. Advised not to get pregnant for 18 months post bariatric surgery as this can increase risk of malnourishment potentially leading to low birth weight or malformation. Patient agrees to avoid pregnancy for at least 18 months post-op. Discussed importance of appropriate contraception.  (Post menopausal)  Will need to be off work for 3-4 weeks post-bariatric surgery.  No lifting/pushing/pulling over 20# for 4 weeks post-op  No NSAIDS 10 days prior to bariatric surgery. Avoid NSAID use post-op  Discussed Lovenox post-op bariatric surgery  LOMN received.   Will continue following with multi-disciplinary team in preparation for bariatric surgery with the expectation of lifelong follow-up post-operatively.   To stop Metformin 2 days prior to surgery.

## 2025-07-21 NOTE — PATIENT INSTRUCTIONS
Goals:     Exercise Goal:  I will make sure walking outside with grand child to stay active  Water Goal:  Continue at least 4 bottles of water or more per day - good job with this!    No more than 1/2- 1 cup of rice at a meal for portion control.  Focus on lean protein foods, vegetables and fruit with meals

## 2025-07-23 ENCOUNTER — HOSPITAL ENCOUNTER (OUTPATIENT)
Dept: PULMONOLOGY | Age: 55
Discharge: HOME | End: 2025-07-23
Payer: MEDICAID

## 2025-07-23 PROCEDURE — 94729 DIFFUSING CAPACITY: CPT

## 2025-07-23 PROCEDURE — 94726 PLETHYSMOGRAPHY LUNG VOLUMES: CPT

## 2025-07-23 PROCEDURE — 6370000000 HC RX 637 (ALT 250 FOR IP): Performed by: INTERNAL MEDICINE

## 2025-07-23 PROCEDURE — 94640 AIRWAY INHALATION TREATMENT: CPT

## 2025-07-23 PROCEDURE — 94060 EVALUATION OF WHEEZING: CPT

## 2025-07-23 RX ORDER — ALBUTEROL SULFATE 90 UG/1
4 INHALANT RESPIRATORY (INHALATION) ONCE
Status: COMPLETED | OUTPATIENT
Start: 2025-07-23 | End: 2025-07-23

## 2025-07-23 RX ADMIN — ALBUTEROL SULFATE 4 PUFF: 90 AEROSOL, METERED RESPIRATORY (INHALATION) at 13:31

## 2025-07-24 NOTE — PROCEDURES
[id number] [place] [address]                           PULMONARY FUNCTION      PATIENT NAME: JENNIFER BERRY         : 1970  MED REC NO: [id number]                         ROOM:   ACCOUNT NO: [id number]                       ADMIT DATE: 2025  PROVIDER: Ismael Willis MD      DATE OF PROCEDURE: 2025    SURGEON:  Ismael Willis MD    REFERRING PHYSICIAN:  CHATO GARCIA    Spirometry shows FVC is 2.83, 83% predicted.  FEV1 is 2.50, 93% predicted.  Both are within normal limits.  FEV1/FVC ratio is normal.  Post bronchodilator, there is no significant response to bronchodilator to suggest airway responsiveness.  Total lung capacity is 4.84, 98% predicted which is within normal limits.  Diffusion capacity is 18.19, 79% predicted, within normal limits.    IMPRESSION:  This pulmonary function test shows normal spirometry without evidence of obstruction.  Normal lung volume and normal diffusion capacity.  Clinical correlation is recommended.          ISMAEL WILLIS MD      D:  2025 18:41:11     T:  2025 20:30:18     CAMELIA/FATEMEH  Job #:  [id number]     Doc#:  [id number]

## 2025-08-04 ENCOUNTER — HOSPITAL ENCOUNTER (OUTPATIENT)
Dept: SLEEP CENTER | Age: 55
Discharge: HOME | End: 2025-08-06
Attending: INTERNAL MEDICINE
Payer: MEDICAID

## 2025-08-04 DIAGNOSIS — G47.30 SLEEP-RELATED BREATHING DISORDER: ICD-10-CM

## 2025-08-04 PROCEDURE — 95810 POLYSOM 6/> YRS 4/> PARAM: CPT

## 2025-08-25 ENCOUNTER — OFFICE VISIT (OUTPATIENT)
Dept: BARIATRICS/WEIGHT MGMT | Age: 55
End: 2025-08-25

## 2025-08-25 ENCOUNTER — OFFICE VISIT (OUTPATIENT)
Dept: BARIATRICS/WEIGHT MGMT | Age: 55
End: 2025-08-25
Payer: MEDICAID

## 2025-08-25 VITALS — BODY MASS INDEX: 37.32 KG/M2 | WEIGHT: 210.6 LBS | HEIGHT: 63 IN

## 2025-08-25 VITALS
DIASTOLIC BLOOD PRESSURE: 84 MMHG | HEART RATE: 73 BPM | BODY MASS INDEX: 37.3 KG/M2 | TEMPERATURE: 96.4 F | WEIGHT: 210.54 LBS | HEIGHT: 63 IN | SYSTOLIC BLOOD PRESSURE: 128 MMHG

## 2025-08-25 DIAGNOSIS — G47.33 OSA (OBSTRUCTIVE SLEEP APNEA): ICD-10-CM

## 2025-08-25 DIAGNOSIS — R73.03 PRE-DIABETES: ICD-10-CM

## 2025-08-25 DIAGNOSIS — E28.2 POLYCYSTIC OVARIAN SYNDROME: ICD-10-CM

## 2025-08-25 DIAGNOSIS — E78.5 HYPERLIPIDEMIA, UNSPECIFIED HYPERLIPIDEMIA TYPE: ICD-10-CM

## 2025-08-25 DIAGNOSIS — M54.50 CHRONIC LOW BACK PAIN, UNSPECIFIED BACK PAIN LATERALITY, UNSPECIFIED WHETHER SCIATICA PRESENT: ICD-10-CM

## 2025-08-25 DIAGNOSIS — N20.0 NEPHROLITHIASIS: ICD-10-CM

## 2025-08-25 DIAGNOSIS — G89.29 CHRONIC LOW BACK PAIN, UNSPECIFIED BACK PAIN LATERALITY, UNSPECIFIED WHETHER SCIATICA PRESENT: ICD-10-CM

## 2025-08-25 PROCEDURE — 1036F TOBACCO NON-USER: CPT | Performed by: PHYSICIAN ASSISTANT

## 2025-08-25 PROCEDURE — 3017F COLORECTAL CA SCREEN DOC REV: CPT | Performed by: PHYSICIAN ASSISTANT

## 2025-08-25 PROCEDURE — 99214 OFFICE O/P EST MOD 30 MIN: CPT | Performed by: PHYSICIAN ASSISTANT

## 2025-08-25 PROCEDURE — G8417 CALC BMI ABV UP PARAM F/U: HCPCS | Performed by: PHYSICIAN ASSISTANT

## 2025-08-25 PROCEDURE — G8427 DOCREV CUR MEDS BY ELIG CLIN: HCPCS | Performed by: PHYSICIAN ASSISTANT
